# Patient Record
Sex: MALE | Race: WHITE | NOT HISPANIC OR LATINO
[De-identification: names, ages, dates, MRNs, and addresses within clinical notes are randomized per-mention and may not be internally consistent; named-entity substitution may affect disease eponyms.]

---

## 2017-01-12 ENCOUNTER — MEDICATION RENEWAL (OUTPATIENT)
Age: 27
End: 2017-01-12

## 2017-02-13 ENCOUNTER — MEDICATION RENEWAL (OUTPATIENT)
Age: 27
End: 2017-02-13

## 2017-02-24 ENCOUNTER — APPOINTMENT (OUTPATIENT)
Dept: INTERNAL MEDICINE | Facility: CLINIC | Age: 27
End: 2017-02-24

## 2017-03-10 ENCOUNTER — APPOINTMENT (OUTPATIENT)
Dept: INTERNAL MEDICINE | Facility: CLINIC | Age: 27
End: 2017-03-10

## 2017-03-13 ENCOUNTER — MEDICATION RENEWAL (OUTPATIENT)
Age: 27
End: 2017-03-13

## 2017-04-12 ENCOUNTER — MEDICATION RENEWAL (OUTPATIENT)
Age: 27
End: 2017-04-12

## 2017-05-03 ENCOUNTER — APPOINTMENT (OUTPATIENT)
Dept: INTERNAL MEDICINE | Facility: CLINIC | Age: 27
End: 2017-05-03

## 2017-05-04 VITALS — DIASTOLIC BLOOD PRESSURE: 70 MMHG | SYSTOLIC BLOOD PRESSURE: 128 MMHG | BODY MASS INDEX: 43.54 KG/M2 | WEIGHT: 315 LBS

## 2017-05-11 ENCOUNTER — MEDICATION RENEWAL (OUTPATIENT)
Age: 27
End: 2017-05-11

## 2017-06-12 ENCOUNTER — MEDICATION RENEWAL (OUTPATIENT)
Age: 27
End: 2017-06-12

## 2017-06-25 ENCOUNTER — APPOINTMENT (OUTPATIENT)
Dept: SLEEP CENTER | Facility: CLINIC | Age: 27
End: 2017-06-25

## 2017-07-11 ENCOUNTER — MEDICATION RENEWAL (OUTPATIENT)
Age: 27
End: 2017-07-11

## 2017-07-19 ENCOUNTER — APPOINTMENT (OUTPATIENT)
Dept: INTERNAL MEDICINE | Facility: CLINIC | Age: 27
End: 2017-07-19

## 2017-07-23 ENCOUNTER — APPOINTMENT (OUTPATIENT)
Dept: SLEEP CENTER | Facility: CLINIC | Age: 27
End: 2017-07-23

## 2017-08-08 ENCOUNTER — MEDICATION RENEWAL (OUTPATIENT)
Age: 27
End: 2017-08-08

## 2017-08-23 ENCOUNTER — APPOINTMENT (OUTPATIENT)
Dept: PAIN MANAGEMENT | Facility: CLINIC | Age: 27
End: 2017-08-23

## 2017-08-24 ENCOUNTER — APPOINTMENT (OUTPATIENT)
Dept: INTERNAL MEDICINE | Facility: CLINIC | Age: 27
End: 2017-08-24

## 2017-09-05 ENCOUNTER — MEDICATION RENEWAL (OUTPATIENT)
Age: 27
End: 2017-09-05

## 2017-09-25 ENCOUNTER — MEDICATION RENEWAL (OUTPATIENT)
Age: 27
End: 2017-09-25

## 2017-10-05 ENCOUNTER — APPOINTMENT (OUTPATIENT)
Dept: SLEEP CENTER | Facility: CLINIC | Age: 27
End: 2017-10-05

## 2017-10-30 ENCOUNTER — MEDICATION RENEWAL (OUTPATIENT)
Age: 27
End: 2017-10-30

## 2017-11-14 ENCOUNTER — MEDICATION RENEWAL (OUTPATIENT)
Age: 27
End: 2017-11-14

## 2017-11-24 ENCOUNTER — MEDICATION RENEWAL (OUTPATIENT)
Age: 27
End: 2017-11-24

## 2017-11-30 ENCOUNTER — OTHER (OUTPATIENT)
Age: 27
End: 2017-11-30

## 2017-12-01 ENCOUNTER — APPOINTMENT (OUTPATIENT)
Dept: INTERNAL MEDICINE | Facility: CLINIC | Age: 27
End: 2017-12-01

## 2017-12-12 ENCOUNTER — EMERGENCY (EMERGENCY)
Facility: HOSPITAL | Age: 27
LOS: 1 days | Discharge: HOME | End: 2017-12-12
Attending: EMERGENCY MEDICINE | Admitting: EMERGENCY MEDICINE
Payer: COMMERCIAL

## 2017-12-12 VITALS
SYSTOLIC BLOOD PRESSURE: 156 MMHG | RESPIRATION RATE: 18 BRPM | TEMPERATURE: 99 F | HEART RATE: 86 BPM | DIASTOLIC BLOOD PRESSURE: 89 MMHG | OXYGEN SATURATION: 96 %

## 2017-12-12 DIAGNOSIS — S73.005A UNSPECIFIED DISLOCATION OF LEFT HIP, INITIAL ENCOUNTER: Chronic | ICD-10-CM

## 2017-12-12 PROCEDURE — 99284 EMERGENCY DEPT VISIT MOD MDM: CPT | Mod: 25

## 2017-12-12 PROCEDURE — 93010 ELECTROCARDIOGRAM REPORT: CPT

## 2017-12-12 NOTE — ED ADULT NURSE NOTE - OBJECTIVE STATEMENT
Patient came to ED ambulatory c/o cough and right chest pain x 2/3 days. Pain mostly w/cough. Patient also c/o right mid back pain. Patient has history of PNa in the past. No further complaints. Denies fever or chills

## 2017-12-12 NOTE — ED ADULT TRIAGE NOTE - CHIEF COMPLAINT QUOTE
R pleuritic chest pain on inspiration x 2 days. Pt had cough/sore throat 1.5 weeks ago that has since resolved, was on Augmentin for 7 days, completed abx. Pt denies SOB. Pt denies fevers, chills.

## 2017-12-13 VITALS
DIASTOLIC BLOOD PRESSURE: 86 MMHG | OXYGEN SATURATION: 96 % | RESPIRATION RATE: 19 BRPM | HEART RATE: 68 BPM | SYSTOLIC BLOOD PRESSURE: 141 MMHG

## 2017-12-13 PROCEDURE — 93005 ELECTROCARDIOGRAM TRACING: CPT

## 2017-12-13 PROCEDURE — 99283 EMERGENCY DEPT VISIT LOW MDM: CPT | Mod: 25

## 2017-12-13 PROCEDURE — 71046 X-RAY EXAM CHEST 2 VIEWS: CPT

## 2017-12-13 PROCEDURE — 71020: CPT | Mod: 26

## 2017-12-13 RX ORDER — GUAIFENESIN/DEXTROMETHORPHAN 600MG-30MG
2 TABLET, EXTENDED RELEASE 12 HR ORAL
Qty: 120 | Refills: 0 | OUTPATIENT
Start: 2017-12-13 | End: 2017-12-17

## 2017-12-13 NOTE — ED PROVIDER NOTE - OBJECTIVE STATEMENT
27 male otherwise healthy here for right chest pain, worse with inspiration. Onset 2 days ago, s/p sore throat and course of augmentin. Pain only when inhales or when coughs. No F/c. Also radiates to back. No shortness of breath, no lightheadedness.   Primary: Dr. Jemal Chowdhury

## 2017-12-13 NOTE — ED PROVIDER NOTE - CARE PLAN
Principal Discharge DX:	Pneumonia of right lung due to infectious organism, unspecified part of lung  Instructions for follow-up, activity and diet:	Take antibiotics as instructed in prescription  Follow up with your primary doctor within the next 1-2 days  If you have any worsening or changing symptoms such as confusion, loss of consciousness, worsening pain, nausea/vomiting, or if you have any other concerns please return to the emergency department

## 2017-12-13 NOTE — ED PROVIDER NOTE - PHYSICAL EXAMINATION
Gen: AAOX3, in no acute distress, speaking in complete sentences, cooperative with examination  HEENT: PERRL, EOMI, moist oral mucosa, patent airway, normal pharynx, supple neck  Heart: RRR, no murmurs or gallops, no friction rub  Lungs: CTAX2, symmetric chest expansion  Abd: BS+, soft and depressible, nontender to palpation  Ext: no edema or cyanosis, no gross deformities  Skin: no rashes or lesions, capillary refill <2 secs  Neuro: no gross deficits Dr. Gil: Gen: AAOX3, in no acute distress, speaking in complete sentences, cooperative with examination  HEENT: PERRL, EOMI, moist oral mucosa, patent airway, normal pharynx, supple neck  Heart: RRR, no murmurs or gallops, no friction rub  Lungs: CTAX2, symmetric chest expansion  Abd: BS+, soft and depressible, nontender to palpation  Ext: no edema or cyanosis, no gross deformities  Skin: no rashes or lesions, capillary refill <2 secs  Neuro: no gross deficits

## 2017-12-13 NOTE — ED PROVIDER NOTE - PLAN OF CARE
Take antibiotics as instructed in prescription  Follow up with your primary doctor within the next 1-2 days  If you have any worsening or changing symptoms such as confusion, loss of consciousness, worsening pain, nausea/vomiting, or if you have any other concerns please return to the emergency department

## 2017-12-13 NOTE — ED PROVIDER NOTE - NS ED ROS FT
GENERAL: No fever or chills, //             EYES: no change in vision, //             HEENT: no trouble swallowing or speaking, //                   GI: no abdominal pain, no nausea or no vomiting, no diarrhea or constipation, //             : No changes in urination,  //            SKIN: no rashes,  //            NEURO: no headache,  //             MSK: No joint pain otherwise as HPI or negative. ~Mony Lockhart M.D., Ph.D. -Resident

## 2017-12-18 ENCOUNTER — MEDICATION RENEWAL (OUTPATIENT)
Age: 27
End: 2017-12-18

## 2017-12-22 ENCOUNTER — MEDICATION RENEWAL (OUTPATIENT)
Age: 27
End: 2017-12-22

## 2018-01-16 ENCOUNTER — MEDICATION RENEWAL (OUTPATIENT)
Age: 28
End: 2018-01-16

## 2018-01-19 ENCOUNTER — MEDICATION RENEWAL (OUTPATIENT)
Age: 28
End: 2018-01-19

## 2018-02-20 ENCOUNTER — MEDICATION RENEWAL (OUTPATIENT)
Age: 28
End: 2018-02-20

## 2018-03-12 ENCOUNTER — MEDICATION RENEWAL (OUTPATIENT)
Age: 28
End: 2018-03-12

## 2018-03-14 ENCOUNTER — APPOINTMENT (OUTPATIENT)
Dept: INTERNAL MEDICINE | Facility: CLINIC | Age: 28
End: 2018-03-14
Payer: COMMERCIAL

## 2018-03-14 VITALS
BODY MASS INDEX: 46.11 KG/M2 | HEART RATE: 70 BPM | WEIGHT: 315 LBS | RESPIRATION RATE: 14 BRPM | DIASTOLIC BLOOD PRESSURE: 84 MMHG | SYSTOLIC BLOOD PRESSURE: 132 MMHG

## 2018-03-14 DIAGNOSIS — N52.9 MALE ERECTILE DYSFUNCTION, UNSPECIFIED: ICD-10-CM

## 2018-03-14 PROCEDURE — 99213 OFFICE O/P EST LOW 20 MIN: CPT

## 2018-03-16 ENCOUNTER — MEDICATION RENEWAL (OUTPATIENT)
Age: 28
End: 2018-03-16

## 2018-03-16 ENCOUNTER — EMERGENCY (EMERGENCY)
Facility: HOSPITAL | Age: 28
LOS: 1 days | Discharge: HOME | End: 2018-03-16
Attending: EMERGENCY MEDICINE | Admitting: EMERGENCY MEDICINE
Payer: COMMERCIAL

## 2018-03-16 VITALS
RESPIRATION RATE: 18 BRPM | HEART RATE: 95 BPM | SYSTOLIC BLOOD PRESSURE: 140 MMHG | DIASTOLIC BLOOD PRESSURE: 81 MMHG | OXYGEN SATURATION: 97 %

## 2018-03-16 VITALS
RESPIRATION RATE: 20 BRPM | OXYGEN SATURATION: 97 % | DIASTOLIC BLOOD PRESSURE: 80 MMHG | SYSTOLIC BLOOD PRESSURE: 148 MMHG | HEART RATE: 81 BPM

## 2018-03-16 DIAGNOSIS — S73.005A UNSPECIFIED DISLOCATION OF LEFT HIP, INITIAL ENCOUNTER: Chronic | ICD-10-CM

## 2018-03-16 PROCEDURE — 71046 X-RAY EXAM CHEST 2 VIEWS: CPT | Mod: 26

## 2018-03-16 PROCEDURE — 71046 X-RAY EXAM CHEST 2 VIEWS: CPT

## 2018-03-16 PROCEDURE — 93005 ELECTROCARDIOGRAM TRACING: CPT

## 2018-03-16 PROCEDURE — 93010 ELECTROCARDIOGRAM REPORT: CPT

## 2018-03-16 PROCEDURE — 99283 EMERGENCY DEPT VISIT LOW MDM: CPT | Mod: 25

## 2018-03-16 PROCEDURE — 99284 EMERGENCY DEPT VISIT MOD MDM: CPT | Mod: 25

## 2018-03-16 RX ORDER — IBUPROFEN 200 MG
1 TABLET ORAL
Qty: 21 | Refills: 0 | OUTPATIENT
Start: 2018-03-16 | End: 2018-03-22

## 2018-03-16 RX ORDER — IBUPROFEN 200 MG
600 TABLET ORAL ONCE
Qty: 0 | Refills: 0 | Status: COMPLETED | OUTPATIENT
Start: 2018-03-16 | End: 2018-03-16

## 2018-03-16 RX ADMIN — Medication 600 MILLIGRAM(S): at 02:28

## 2018-03-16 NOTE — ED ADULT NURSE NOTE - OBJECTIVE STATEMENT
Left sided chest wall/flank pain since this morning. Patient denies cough. Pain is worse when he takes a deep breath. Patient denies recent travel.
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
## 2018-03-16 NOTE — ED ADULT NURSE NOTE - DISCHARGE TEACHING
Pt discharged as per MD order, d/c instructions provided; pt verbalized understanding; VSS at time of discharge.A&Ox4. Ambulating without difficulty.

## 2018-03-16 NOTE — ED PROVIDER NOTE - OBJECTIVE STATEMENT
27 y.o. male previously healthy pw left sided chest pain x 2 days, also with runny nose and nasal congestion. No cough. Worse when taking a deep breath. Sharp. No ab pain, nausea, vomiting. No trauma or rash. No recent travel, no immobilization or recent surgery. No leg swelling. 27 y.o. male previously healthy pw left sided chest pain x 2 days, also with runny nose and nasal congestion. No cough. Non-pleuritic, non-exertional. No ab pain, nausea, vomiting. No trauma or rash. No recent travel, no immobilization or recent surgery. No leg swelling.

## 2018-03-16 NOTE — ED PROVIDER NOTE - NS ED ROS FT
ROS: denies HA, weakness, dizziness, fevers/chills, nausea/vomiting, SOB, diaphoresis, abdominal pain, back/neck pain, dysuria/hematuria, or rash  +left sided chest pain

## 2018-03-16 NOTE — ED PROVIDER NOTE - ATTENDING CONTRIBUTION TO CARE
I was physically present for the E/M service provided. I agree with above history, physical, and plan which I have reviewed and edited where appropriate. I was physically present for the key portions of the service provided.    27M p/w intermittent non-exertional, non-pleuritic left side chest pain a/w URI Sx. No h/o HTN. HL. DM, tobacco use or early family h/o MI. No recent travel or recent immobilization. No LE pain or swelling. Heart score 0. Well's and PERC negative. Afebrile. Lungs CTA. Heart RRR. No LE TTP or edema. CXR clear. EKG NSR. Supportive care oupt w/ NSAID and f/u PCP.

## 2018-03-20 ENCOUNTER — MED ADMIN CHARGE (OUTPATIENT)
Age: 28
End: 2018-03-20

## 2018-04-12 ENCOUNTER — MEDICATION RENEWAL (OUTPATIENT)
Age: 28
End: 2018-04-12

## 2018-04-16 ENCOUNTER — MEDICATION RENEWAL (OUTPATIENT)
Age: 28
End: 2018-04-16

## 2018-05-07 ENCOUNTER — MEDICATION RENEWAL (OUTPATIENT)
Age: 28
End: 2018-05-07

## 2018-05-11 ENCOUNTER — MEDICATION RENEWAL (OUTPATIENT)
Age: 28
End: 2018-05-11

## 2018-05-25 ENCOUNTER — MEDICATION RENEWAL (OUTPATIENT)
Age: 28
End: 2018-05-25

## 2018-06-05 ENCOUNTER — MEDICATION RENEWAL (OUTPATIENT)
Age: 28
End: 2018-06-05

## 2018-06-07 ENCOUNTER — MEDICATION RENEWAL (OUTPATIENT)
Age: 28
End: 2018-06-07

## 2018-07-02 ENCOUNTER — MEDICATION RENEWAL (OUTPATIENT)
Age: 28
End: 2018-07-02

## 2018-07-11 ENCOUNTER — MEDICATION RENEWAL (OUTPATIENT)
Age: 28
End: 2018-07-11

## 2018-07-27 ENCOUNTER — MEDICATION RENEWAL (OUTPATIENT)
Age: 28
End: 2018-07-27

## 2018-08-27 ENCOUNTER — MEDICATION RENEWAL (OUTPATIENT)
Age: 28
End: 2018-08-27

## 2018-09-21 ENCOUNTER — MEDICATION RENEWAL (OUTPATIENT)
Age: 28
End: 2018-09-21

## 2018-09-21 ENCOUNTER — OTHER (OUTPATIENT)
Age: 28
End: 2018-09-21

## 2018-10-15 ENCOUNTER — MEDICATION RENEWAL (OUTPATIENT)
Age: 28
End: 2018-10-15

## 2018-10-16 ENCOUNTER — MEDICATION RENEWAL (OUTPATIENT)
Age: 28
End: 2018-10-16

## 2018-11-15 ENCOUNTER — MEDICATION RENEWAL (OUTPATIENT)
Age: 28
End: 2018-11-15

## 2018-12-14 ENCOUNTER — MEDICATION RENEWAL (OUTPATIENT)
Age: 28
End: 2018-12-14

## 2019-01-11 PROBLEM — M54.9 DORSALGIA, UNSPECIFIED: Chronic | Status: ACTIVE | Noted: 2017-12-12

## 2019-01-12 ENCOUNTER — MEDICATION RENEWAL (OUTPATIENT)
Age: 29
End: 2019-01-12

## 2019-01-24 ENCOUNTER — APPOINTMENT (OUTPATIENT)
Dept: INTERNAL MEDICINE | Facility: CLINIC | Age: 29
End: 2019-01-24
Payer: COMMERCIAL

## 2019-01-24 PROCEDURE — 99212 OFFICE O/P EST SF 10 MIN: CPT

## 2019-01-24 NOTE — PHYSICAL EXAM
[Normal] : Normal [L-Spine ___ (level)] : ~Ulevel [unfilled] lumbar spine [4] : L4 [5] : L5 [Restricted] : was restricted

## 2019-01-24 NOTE — HISTORY OF PRESENT ILLNESS
[Back Pain] : back pain [Doing Well] : doing well [Back Pain] : improved back pain [Constipation] : constipation [Pain Relief Sufficient to Make Difference] : pain relief is sufficient to make a difference [Better] : overall functioning better  [Mild] : mild constipation [None] : no mental cloudiness [Adherent] : the patient is adherent to ~his/her~ medication regimen [Goals--Doing Well] : the patient is doing well with ~his/her~ goals [Routine Drug Testing] : routine drug testing [Opioid Contract Renewal] : opioid contract renewal [Numbness] : no numbness [Weakness] : no weakness [Depression] : no depression [Sleep Disturbance] : no sleep disturbance [Side Effects] : ~he/she~ denies medication side effects

## 2019-01-24 NOTE — ASSESSMENT
[FreeTextEntry1] : Review of use of pain medications seems appropriate. Able to maintain social and occupational functionality. Denies such side effects as excessive somnolence, constipation and pruritus.  No evidence or concern about aberrant behavior, including diverting or use for non-medical reasons.\par \par Meds renewed.  Establishing with pain management

## 2019-01-29 LAB — COMPREHENSIVE SCREEN URINE: NORMAL

## 2019-02-11 ENCOUNTER — MEDICATION RENEWAL (OUTPATIENT)
Age: 29
End: 2019-02-11

## 2019-02-14 NOTE — ED PROVIDER NOTE - TOBACCO USE
Call back to patient and left detailed message advising [facility] out of the office and no results are back yet. Ok per Lily form. Advised to call back with any questions. Unknown if ever smoked

## 2019-02-26 ENCOUNTER — APPOINTMENT (OUTPATIENT)
Dept: INTERNAL MEDICINE | Facility: CLINIC | Age: 29
End: 2019-02-26
Payer: COMMERCIAL

## 2019-02-26 PROCEDURE — 99213 OFFICE O/P EST LOW 20 MIN: CPT

## 2019-02-26 NOTE — ASSESSMENT
[FreeTextEntry1] : Review of use of pain medications seems appropriate. Able to maintain social and occupational functionality. Denies such side effects as excessive somnolence, constipation and pruritus.  No evidence or concern about aberrant behavior, including diverting or use for non-medical reasons.  Given ongoing pain issues, seems appropriate to continue regimen as is; tapering not feasible at this time, but will revisit in future as situation changes.   Will be transitioning to pain management specialist care.\par

## 2019-02-26 NOTE — HISTORY OF PRESENT ILLNESS
[de-identified] : . [Back Pain] : back pain [Doing Well] : doing well [None] : There are no comorbid illnesses [Back Pain] : stable back pain [Numbness] : no numbness [Weakness] : no weakness [Depression] : no depression [Sleep Disturbance] : no sleep disturbance [Constipation] : no constipation [5] : Average:5 [Better] : overall functioning better  [Mild] : mild constipation [Adherent] : the patient is adherent to ~his/her~ medication regimen [Routine Drug Testing] : routine drug testing

## 2019-03-03 LAB — COMPREHENSIVE SCREEN URINE: NORMAL

## 2019-03-13 ENCOUNTER — MEDICATION RENEWAL (OUTPATIENT)
Age: 29
End: 2019-03-13

## 2019-03-26 ENCOUNTER — APPOINTMENT (OUTPATIENT)
Dept: INTERNAL MEDICINE | Facility: CLINIC | Age: 29
End: 2019-03-26
Payer: COMMERCIAL

## 2019-03-26 PROCEDURE — 99213 OFFICE O/P EST LOW 20 MIN: CPT

## 2019-03-27 NOTE — HISTORY OF PRESENT ILLNESS
[Back Pain] : back pain [Doing Well] : doing well [None] : There are no comorbid illnesses [Back Pain] : stable back pain [Numbness] : no numbness [Weakness] : no weakness [Depression] : no depression [Sleep Disturbance] : no sleep disturbance [Constipation] : no constipation [5] : Average:5 [Better] : overall functioning better  [Mild] : mild constipation [Adherent] : the patient is adherent to ~his/her~ medication regimen [Routine Drug Testing] : routine drug testing

## 2019-04-01 LAB — COMPREHENSIVE SCREEN URINE: NORMAL

## 2019-04-09 ENCOUNTER — MEDICATION RENEWAL (OUTPATIENT)
Age: 29
End: 2019-04-09

## 2019-04-16 ENCOUNTER — INPATIENT (INPATIENT)
Facility: HOSPITAL | Age: 29
LOS: 0 days | Discharge: HOME | DRG: 125 | End: 2019-04-17
Attending: SPECIALIST | Admitting: SPECIALIST
Payer: MEDICAID

## 2019-04-16 VITALS
OXYGEN SATURATION: 100 % | RESPIRATION RATE: 18 BRPM | HEIGHT: 73 IN | WEIGHT: 300.05 LBS | DIASTOLIC BLOOD PRESSURE: 100 MMHG | TEMPERATURE: 98 F | SYSTOLIC BLOOD PRESSURE: 148 MMHG | HEART RATE: 59 BPM

## 2019-04-16 DIAGNOSIS — S73.005A UNSPECIFIED DISLOCATION OF LEFT HIP, INITIAL ENCOUNTER: Chronic | ICD-10-CM

## 2019-04-16 DIAGNOSIS — S02.80XA FRACTURE OF OTHER SPECIFIED SKULL AND FACIAL BONES, UNSPECIFIED SIDE, INITIAL ENCOUNTER FOR CLOSED FRACTURE: ICD-10-CM

## 2019-04-16 LAB
ALBUMIN SERPL ELPH-MCNC: 4.4 G/DL — SIGNIFICANT CHANGE UP (ref 3.3–5)
ALP SERPL-CCNC: 71 U/L — SIGNIFICANT CHANGE UP (ref 40–120)
ALT FLD-CCNC: 19 U/L — SIGNIFICANT CHANGE UP (ref 10–45)
ANION GAP SERPL CALC-SCNC: 14 MMOL/L — SIGNIFICANT CHANGE UP (ref 5–17)
APTT BLD: 27.5 SEC — SIGNIFICANT CHANGE UP (ref 27.5–36.3)
AST SERPL-CCNC: 20 U/L — SIGNIFICANT CHANGE UP (ref 10–40)
BASOPHILS # BLD AUTO: 0 K/UL — SIGNIFICANT CHANGE UP (ref 0–0.2)
BASOPHILS NFR BLD AUTO: 0.1 % — SIGNIFICANT CHANGE UP (ref 0–2)
BILIRUB SERPL-MCNC: 0.5 MG/DL — SIGNIFICANT CHANGE UP (ref 0.2–1.2)
BLD GP AB SCN SERPL QL: NEGATIVE — SIGNIFICANT CHANGE UP
BUN SERPL-MCNC: 16 MG/DL — SIGNIFICANT CHANGE UP (ref 7–23)
CALCIUM SERPL-MCNC: 9.7 MG/DL — SIGNIFICANT CHANGE UP (ref 8.4–10.5)
CHLORIDE SERPL-SCNC: 103 MMOL/L — SIGNIFICANT CHANGE UP (ref 96–108)
CO2 SERPL-SCNC: 21 MMOL/L — LOW (ref 22–31)
CREAT SERPL-MCNC: 1.22 MG/DL — SIGNIFICANT CHANGE UP (ref 0.5–1.3)
EOSINOPHIL # BLD AUTO: 0.1 K/UL — SIGNIFICANT CHANGE UP (ref 0–0.5)
EOSINOPHIL NFR BLD AUTO: 0.8 % — SIGNIFICANT CHANGE UP (ref 0–6)
GLUCOSE SERPL-MCNC: 101 MG/DL — HIGH (ref 70–99)
HCT VFR BLD CALC: 51.5 % — HIGH (ref 39–50)
HGB BLD-MCNC: 17.3 G/DL — HIGH (ref 13–17)
INR BLD: 1.06 RATIO — SIGNIFICANT CHANGE UP (ref 0.88–1.16)
LYMPHOCYTES # BLD AUTO: 1.3 K/UL — SIGNIFICANT CHANGE UP (ref 1–3.3)
LYMPHOCYTES # BLD AUTO: 11.5 % — LOW (ref 13–44)
MCHC RBC-ENTMCNC: 30 PG — SIGNIFICANT CHANGE UP (ref 27–34)
MCHC RBC-ENTMCNC: 33.7 GM/DL — SIGNIFICANT CHANGE UP (ref 32–36)
MCV RBC AUTO: 89 FL — SIGNIFICANT CHANGE UP (ref 80–100)
MONOCYTES # BLD AUTO: 0.6 K/UL — SIGNIFICANT CHANGE UP (ref 0–0.9)
MONOCYTES NFR BLD AUTO: 5.4 % — SIGNIFICANT CHANGE UP (ref 2–14)
NEUTROPHILS # BLD AUTO: 9.4 K/UL — HIGH (ref 1.8–7.4)
NEUTROPHILS NFR BLD AUTO: 82.2 % — HIGH (ref 43–77)
PLATELET # BLD AUTO: 210 K/UL — SIGNIFICANT CHANGE UP (ref 150–400)
POTASSIUM SERPL-MCNC: 4.1 MMOL/L — SIGNIFICANT CHANGE UP (ref 3.5–5.3)
POTASSIUM SERPL-SCNC: 4.1 MMOL/L — SIGNIFICANT CHANGE UP (ref 3.5–5.3)
PROT SERPL-MCNC: 7.1 G/DL — SIGNIFICANT CHANGE UP (ref 6–8.3)
PROTHROM AB SERPL-ACNC: 12.1 SEC — SIGNIFICANT CHANGE UP (ref 10–12.9)
RBC # BLD: 5.78 M/UL — SIGNIFICANT CHANGE UP (ref 4.2–5.8)
RBC # FLD: 13.1 % — SIGNIFICANT CHANGE UP (ref 10.3–14.5)
RH IG SCN BLD-IMP: POSITIVE — SIGNIFICANT CHANGE UP
SODIUM SERPL-SCNC: 138 MMOL/L — SIGNIFICANT CHANGE UP (ref 135–145)
WBC # BLD: 11.4 K/UL — HIGH (ref 3.8–10.5)
WBC # FLD AUTO: 11.4 K/UL — HIGH (ref 3.8–10.5)

## 2019-04-16 PROCEDURE — 71045 X-RAY EXAM CHEST 1 VIEW: CPT | Mod: 26

## 2019-04-16 PROCEDURE — 12011 RPR F/E/E/N/L/M 2.5 CM/<: CPT

## 2019-04-16 PROCEDURE — 70486 CT MAXILLOFACIAL W/O DYE: CPT | Mod: 26

## 2019-04-16 PROCEDURE — 99285 EMERGENCY DEPT VISIT HI MDM: CPT | Mod: 25

## 2019-04-16 RX ORDER — DEXAMETHASONE 0.5 MG/5ML
10 ELIXIR ORAL
Qty: 0 | Refills: 0 | Status: DISCONTINUED | OUTPATIENT
Start: 2019-04-16 | End: 2019-04-17

## 2019-04-16 RX ORDER — ACETAMINOPHEN 500 MG
650 TABLET ORAL EVERY 6 HOURS
Qty: 0 | Refills: 0 | Status: DISCONTINUED | OUTPATIENT
Start: 2019-04-16 | End: 2019-04-17

## 2019-04-16 RX ORDER — TETANUS TOXOID, REDUCED DIPHTHERIA TOXOID AND ACELLULAR PERTUSSIS VACCINE, ADSORBED 5; 2.5; 8; 8; 2.5 [IU]/.5ML; [IU]/.5ML; UG/.5ML; UG/.5ML; UG/.5ML
0.5 SUSPENSION INTRAMUSCULAR ONCE
Qty: 0 | Refills: 0 | Status: COMPLETED | OUTPATIENT
Start: 2019-04-16 | End: 2019-04-16

## 2019-04-16 RX ORDER — SODIUM CHLORIDE 9 MG/ML
1000 INJECTION, SOLUTION INTRAVENOUS
Qty: 0 | Refills: 0 | Status: COMPLETED | OUTPATIENT
Start: 2019-04-16 | End: 2020-03-14

## 2019-04-16 RX ORDER — ACETAMINOPHEN 500 MG
975 TABLET ORAL ONCE
Qty: 0 | Refills: 0 | Status: COMPLETED | OUTPATIENT
Start: 2019-04-16 | End: 2019-04-16

## 2019-04-16 RX ORDER — OXYCODONE AND ACETAMINOPHEN 5; 325 MG/1; MG/1
2 TABLET ORAL EVERY 6 HOURS
Qty: 0 | Refills: 0 | Status: DISCONTINUED | OUTPATIENT
Start: 2019-04-16 | End: 2019-04-17

## 2019-04-16 RX ORDER — OXYCODONE AND ACETAMINOPHEN 5; 325 MG/1; MG/1
1 TABLET ORAL EVERY 4 HOURS
Qty: 0 | Refills: 0 | Status: DISCONTINUED | OUTPATIENT
Start: 2019-04-16 | End: 2019-04-17

## 2019-04-16 RX ADMIN — Medication 10 MILLIGRAM(S): at 17:08

## 2019-04-16 RX ADMIN — Medication 975 MILLIGRAM(S): at 07:22

## 2019-04-16 RX ADMIN — Medication 650 MILLIGRAM(S): at 17:11

## 2019-04-16 RX ADMIN — TETANUS TOXOID, REDUCED DIPHTHERIA TOXOID AND ACELLULAR PERTUSSIS VACCINE, ADSORBED 0.5 MILLILITER(S): 5; 2.5; 8; 8; 2.5 SUSPENSION INTRAMUSCULAR at 03:28

## 2019-04-16 RX ADMIN — Medication 975 MILLIGRAM(S): at 04:04
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
## 2019-04-16 NOTE — ED PROVIDER NOTE - ATTENDING CONTRIBUTION TO CARE
Attending MD Fanny Vega:  I personally have seen and examined this patient.  Resident note reviewed and agree on plan of care and except where noted.  See HPI, PE, and MDM for details.
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
## 2019-04-16 NOTE — ED PROVIDER NOTE - SKIN COLOR
1cm laceration mucosal aspect of left side of upper lip - does not cross vermillion border. No foreign body present.
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
## 2019-04-16 NOTE — H&P ADULT - NSHPPHYSICALEXAM_GEN_ALL_CORE
Gen: NAD, well appearing male  HEENT: EOMI, no conjunctival hemorrhage. Sensation grossly intact in V1-3. Occlusion stable and reproducible. Condylar head palpated. No bony stepoffs palpated. No intranasal hemorrhage or septal hematoma

## 2019-04-16 NOTE — ED PROVIDER NOTE - PHYSICAL EXAMINATION
Attending Fanny Vega: Gen: NAD, heent: atrauamtic, eomi, perrla, right maxilla ttp,swelling to nare upper lip with laceration apprximatly 2cm lip flap, lower lip laceration mmm, op pink, uvula midline, neck; nttp, no nuchal rigidity, chest: nttp, no crepitus, cv: rrr, no murmurs, lungs: ctab, abd: soft, nontender, nondistended, no peritoneal signs, +BS, no guarding, ext: wwp, neg homans, skin: no rash, neuro: awake and alert, following commands, speech clear, sensation and strength intact, no focal deficits

## 2019-04-16 NOTE — H&P ADULT - NSHPLABSRESULTS_GEN_ALL_CORE
CBC (04-16 @ 07:24)                              17.3<H>                         11.4<H>  )----------------(  210        82.2<H>% Neutrophils, 11.5<L>% Lymphocytes, ANC: 9.4<H>                              51.5<H>                BMP (04-16 @ 07:24)             138     |  103     |  16    		Ca++ --      Ca 9.7                ---------------------------------( 101<H>		Mg --                 4.1     |  21<L>   |  1.22  			Ph --        LFTs (04-16 @ 07:24)      TPro 7.1 / Alb 4.4 / TBili 0.5 / DBili -- / AST 20 / ALT 19 / AlkPhos 71    Coags (04-16 @ 07:24)  aPTT 27.5 / INR 1.06 / PT 12.1
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
## 2019-04-17 ENCOUNTER — TRANSCRIPTION ENCOUNTER (OUTPATIENT)
Age: 29
End: 2019-04-17

## 2019-04-17 VITALS
HEART RATE: 110 BPM | TEMPERATURE: 97 F | OXYGEN SATURATION: 98 % | RESPIRATION RATE: 18 BRPM | SYSTOLIC BLOOD PRESSURE: 147 MMHG | DIASTOLIC BLOOD PRESSURE: 73 MMHG

## 2019-04-17 LAB
BLD GP AB SCN SERPL QL: NEGATIVE — SIGNIFICANT CHANGE UP
RH IG SCN BLD-IMP: POSITIVE — SIGNIFICANT CHANGE UP

## 2019-04-17 RX ORDER — ACETAMINOPHEN 500 MG
2 TABLET ORAL
Qty: 0 | Refills: 0 | COMMUNITY
Start: 2019-04-17

## 2019-04-17 RX ORDER — CHLORHEXIDINE GLUCONATE 213 G/1000ML
15 SOLUTION TOPICAL
Qty: 100 | Refills: 0 | OUTPATIENT
Start: 2019-04-17

## 2019-04-17 RX ORDER — OXYCODONE HYDROCHLORIDE 5 MG/1
0 TABLET ORAL
Qty: 0 | Refills: 0 | COMMUNITY

## 2019-04-17 RX ORDER — SODIUM CHLORIDE 9 MG/ML
1000 INJECTION, SOLUTION INTRAVENOUS
Qty: 0 | Refills: 0 | Status: DISCONTINUED | OUTPATIENT
Start: 2019-04-17 | End: 2019-04-17

## 2019-04-17 RX ADMIN — Medication 650 MILLIGRAM(S): at 06:10

## 2019-04-17 RX ADMIN — Medication 10 MILLIGRAM(S): at 05:32

## 2019-04-17 RX ADMIN — Medication 650 MILLIGRAM(S): at 05:34

## 2019-04-17 NOTE — DISCHARGE NOTE NURSING/CASE MANAGEMENT/SOCIAL WORK - NSDCDPATPORTLINK_GEN_ALL_CORE
You can access the [facility] Patient Portal, offered by [facility], by registering with the following website: [email or web address] Health [email or web address]

## 2019-04-17 NOTE — DISCHARGE NOTE PROVIDER - NSDCFUADDINST_GEN_ALL_CORE_FT
Keep head elevated.    Soft diet.    Wash mouth with mouthwash twice daily and perform regular dental hygiene.    Take medications as prescribed.    No nose blowing. Sneeze with mouth open.    Ice packs as needed to face.    Call Dr. Yin's office regarding your upcoming surgery. [phone].    Return to [place] at 7 am for your surgery on 4/19/2019.
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
## 2019-04-17 NOTE — DISCHARGE NOTE PROVIDER - CARE PROVIDER_API CALL
Nely Yin)  Plastic Surgery  [address] [place] [address]  Phone: [phone]  Fax: [phone]  Follow Up Time:
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
## 2019-04-18 RX ORDER — SODIUM CHLORIDE 9 MG/ML
3 INJECTION INTRAMUSCULAR; INTRAVENOUS; SUBCUTANEOUS EVERY 8 HOURS
Qty: 0 | Refills: 0 | Status: DISCONTINUED | OUTPATIENT
Start: 2019-04-25 | End: 2019-04-25

## 2019-04-23 ENCOUNTER — APPOINTMENT (OUTPATIENT)
Dept: INTERNAL MEDICINE | Facility: CLINIC | Age: 29
End: 2019-04-23
Payer: COMMERCIAL

## 2019-04-23 PROCEDURE — 99213 OFFICE O/P EST LOW 20 MIN: CPT
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
## 2019-04-24 ENCOUNTER — TRANSCRIPTION ENCOUNTER (OUTPATIENT)
Age: 29
End: 2019-04-24

## 2019-04-25 ENCOUNTER — OUTPATIENT (OUTPATIENT)
Dept: OUTPATIENT SERVICES | Facility: HOSPITAL | Age: 29
LOS: 1 days | End: 2019-04-25
Payer: MEDICAID

## 2019-04-25 VITALS
DIASTOLIC BLOOD PRESSURE: 76 MMHG | SYSTOLIC BLOOD PRESSURE: 136 MMHG | RESPIRATION RATE: 18 BRPM | OXYGEN SATURATION: 98 % | HEIGHT: 73 IN | TEMPERATURE: 97 F | WEIGHT: 315 LBS | HEART RATE: 92 BPM

## 2019-04-25 VITALS
OXYGEN SATURATION: 95 % | RESPIRATION RATE: 16 BRPM | SYSTOLIC BLOOD PRESSURE: 132 MMHG | HEART RATE: 88 BPM | TEMPERATURE: 99 F | DIASTOLIC BLOOD PRESSURE: 73 MMHG

## 2019-04-25 DIAGNOSIS — S73.005A UNSPECIFIED DISLOCATION OF LEFT HIP, INITIAL ENCOUNTER: Chronic | ICD-10-CM

## 2019-04-25 DIAGNOSIS — S02.80XA FRACTURE OF OTHER SPECIFIED SKULL AND FACIAL BONES, UNSPECIFIED SIDE, INITIAL ENCOUNTER FOR CLOSED FRACTURE: ICD-10-CM

## 2019-04-25 PROCEDURE — 76377 3D RENDER W/INTRP POSTPROCES: CPT

## 2019-04-25 PROCEDURE — 70486 CT MAXILLOFACIAL W/O DYE: CPT

## 2019-04-25 PROCEDURE — 76377 3D RENDER W/INTRP POSTPROCES: CPT | Mod: 26

## 2019-04-25 PROCEDURE — 21320 CLSD TX NSL FX W/MNPJ&STABLJ: CPT

## 2019-04-25 PROCEDURE — C1713: CPT

## 2019-04-25 PROCEDURE — 21390 OPN TX ORBIT PERIORBTL IMPLT: CPT

## 2019-04-25 PROCEDURE — 70486 CT MAXILLOFACIAL W/O DYE: CPT | Mod: 26

## 2019-04-25 RX ORDER — HYDROMORPHONE HYDROCHLORIDE 2 MG/ML
0.5 INJECTION INTRAMUSCULAR; INTRAVENOUS; SUBCUTANEOUS
Qty: 0 | Refills: 0 | Status: DISCONTINUED | OUTPATIENT
Start: 2019-04-25 | End: 2019-04-25

## 2019-04-25 RX ORDER — OXYCODONE HYDROCHLORIDE 5 MG/1
1 TABLET ORAL
Qty: 10 | Refills: 0 | OUTPATIENT
Start: 2019-04-25

## 2019-04-25 RX ORDER — HALOPERIDOL DECANOATE 100 MG/ML
1 INJECTION INTRAMUSCULAR ONCE
Qty: 0 | Refills: 0 | Status: DISCONTINUED | OUTPATIENT
Start: 2019-04-25 | End: 2019-05-10

## 2019-04-25 RX ORDER — SODIUM CHLORIDE 9 MG/ML
1000 INJECTION, SOLUTION INTRAVENOUS
Qty: 0 | Refills: 0 | Status: DISCONTINUED | OUTPATIENT
Start: 2019-04-25 | End: 2019-05-10

## 2019-04-25 RX ORDER — BACITRACIN 500 [USP'U]/G
0 OINTMENT OPHTHALMIC
Qty: 0 | Refills: 0 | COMMUNITY

## 2019-04-25 RX ADMIN — SODIUM CHLORIDE 100 MILLILITER(S): 9 INJECTION, SOLUTION INTRAVENOUS at 10:03
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
## 2019-04-25 NOTE — ASU DISCHARGE PLAN (ADULT/PEDIATRIC) - ASU DC SPECIAL INSTRUCTIONSFT
Keep the sutures in your eyelid in place and do not get wet. Apply the ophthalmic bacitracin to your R lower eyelid incision 2 times per day.  Sleep your the head of your bed elevated.    Keep the nasal splint in place and keep it dry.    Dr. Yin's office will call you to set up your follow-up appointment to have the sutures removed.

## 2019-04-25 NOTE — ASU DISCHARGE PLAN (ADULT/PEDIATRIC) - CALL YOUR DOCTOR IF YOU HAVE ANY OF THE FOLLOWING:
Fever greater than (need to indicate Fahrenheit or Celsius)/Bleeding that does not stop/Pain not relieved by Medications

## 2019-05-07 ENCOUNTER — MEDICATION RENEWAL (OUTPATIENT)
Age: 29
End: 2019-05-07

## 2019-05-23 ENCOUNTER — APPOINTMENT (OUTPATIENT)
Dept: INTERNAL MEDICINE | Facility: CLINIC | Age: 29
End: 2019-05-23
Payer: COMMERCIAL

## 2019-05-23 ENCOUNTER — MEDICATION RENEWAL (OUTPATIENT)
Age: 29
End: 2019-05-23

## 2019-05-23 PROCEDURE — 99213 OFFICE O/P EST LOW 20 MIN: CPT

## 2019-05-27 NOTE — HISTORY OF PRESENT ILLNESS
[Back Pain] : back pain [Doing Well] : doing well [None] : There are no comorbid illnesses [Numbness] : no numbness [Back Pain] : stable back pain [Depression] : no depression [Weakness] : no weakness [Sleep Disturbance] : no sleep disturbance [Constipation] : no constipation [5] : Average:5 [Adherent] : the patient is adherent to ~his/her~ medication regimen [Mild] : mild constipation [Better] : overall functioning better  [Routine Drug Testing] : routine drug testing

## 2019-05-29 LAB — COMPREHENSIVE SCREEN URINE: NORMAL

## 2019-06-03 PROBLEM — N52.9 MALE ERECTILE DISORDER: Status: ACTIVE | Noted: 2018-03-14

## 2019-06-04 ENCOUNTER — MEDICATION RENEWAL (OUTPATIENT)
Age: 29
End: 2019-06-04

## 2019-06-20 ENCOUNTER — APPOINTMENT (OUTPATIENT)
Dept: INTERNAL MEDICINE | Facility: CLINIC | Age: 29
End: 2019-06-20
Payer: COMMERCIAL

## 2019-06-20 PROCEDURE — 99212 OFFICE O/P EST SF 10 MIN: CPT

## 2019-06-21 NOTE — HISTORY OF PRESENT ILLNESS
[Back Pain] : back pain [Doing Well] : doing well [Back Pain] : stable back pain [Pain Relief Sufficient to Make Difference] : pain relief is sufficient to make a difference [Same] : overall functioning same [None] : no mental cloudiness [Adherent] : the patient is adherent to ~his/her~ medication regimen [Side Effects] : ~he/she~ denies medication side effects [Goals--Doing Well] : the patient is doing well with ~his/her~ goals

## 2019-06-26 LAB — COMPREHENSIVE SCREEN URINE: NORMAL

## 2019-07-18 ENCOUNTER — APPOINTMENT (OUTPATIENT)
Dept: INTERNAL MEDICINE | Facility: CLINIC | Age: 29
End: 2019-07-18
Payer: COMMERCIAL

## 2019-07-18 PROCEDURE — 99213 OFFICE O/P EST LOW 20 MIN: CPT
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
## 2019-07-21 ENCOUNTER — EMERGENCY (EMERGENCY)
Facility: HOSPITAL | Age: 29
LOS: 1 days | Discharge: HOME | End: 2019-07-21
Attending: STUDENT IN AN ORGANIZED HEALTH CARE EDUCATION/TRAINING PROGRAM
Payer: MEDICAID

## 2019-07-21 VITALS
HEIGHT: 73 IN | DIASTOLIC BLOOD PRESSURE: 80 MMHG | SYSTOLIC BLOOD PRESSURE: 118 MMHG | HEART RATE: 77 BPM | TEMPERATURE: 99 F | RESPIRATION RATE: 18 BRPM | WEIGHT: 315 LBS | OXYGEN SATURATION: 95 %

## 2019-07-21 DIAGNOSIS — S73.005A UNSPECIFIED DISLOCATION OF LEFT HIP, INITIAL ENCOUNTER: Chronic | ICD-10-CM

## 2019-07-21 PROCEDURE — 99284 EMERGENCY DEPT VISIT MOD MDM: CPT | Mod: 25

## 2019-07-21 PROCEDURE — 93010 ELECTROCARDIOGRAM REPORT: CPT

## 2019-07-22 PROCEDURE — 71046 X-RAY EXAM CHEST 2 VIEWS: CPT

## 2019-07-22 PROCEDURE — 99283 EMERGENCY DEPT VISIT LOW MDM: CPT

## 2019-07-22 PROCEDURE — 93005 ELECTROCARDIOGRAM TRACING: CPT

## 2019-07-22 PROCEDURE — 71046 X-RAY EXAM CHEST 2 VIEWS: CPT | Mod: 26

## 2019-07-22 NOTE — ED PROVIDER NOTE - PHYSICAL EXAMINATION
General: Well appearing, alert, oriented, no acute distress. Resting in bed.  HEENT: PERRLA EOMI. No trauma/bruising noted to head or face.   CV: Regular rate and rhythm, S1/S2, no murmurs/rubs/gallops noted on exam. No tenderness to palpation to chest wall.  Lungs: Clear to ascultation bilaterally, no wheezes/crackles/rales noted on exam.   Abdomen: Soft, non tender, non distended, no guarding or rebound. No CVA tenderness to palpation.   MSK: Full ROM of upper and lower extremities bilaterally. Full ROM of neck.   Neuro: Awake, A+O x4, moving all extremities spontaneously. CN 2-12 grossly intact. Strength and sensation grossly intact to all extremities. Ambulatory w/o assist, normal gait.   Extremities: No swelling or edema noted to extremities. No calf tenderness to palpation.   Skin: No rash noted on exam.
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
## 2019-07-22 NOTE — ED PROVIDER NOTE - NSFOLLOWUPINSTRUCTIONS_ED_ALL_ED_FT
Please follow up with your primary medical doctor this week for further care    Return to hospital for any new or concerning symptoms, including but not limited to: fevers, chills, nausea, vomiting, headache, dizziness, lightheadedness, chest pain, shortness of breath, difficulty breathing, abdominal pain, weakness, or any other new or concerning symptoms.    Take motrin 600mg every 6 hours as needed for pain  Take Tylenol up to 650 mg every 6 hours as needed for pain.

## 2019-07-22 NOTE — ED PROVIDER NOTE - PROGRESS NOTE DETAILS
CXR and EKG wnl. Patient resting in bed, no acute distress. Vitals wnl. Exam wnl. Low clinical suspicion for PNA, ACS, PE, or intra-abd pathology. Will d/c home with instructions to f/u with primary medical doctor this week as well as strict return precautions.  Spoke with patient extensively regarding current differential diagnosis for ongoing symptoms, and patient acknowledged understanding. All questions and concerns have been addressed with the patient. I have discussed the plan for care and patient is in agreement.   Marcus Fragoso MD, PGY3 Emergency Medicine

## 2019-07-22 NOTE — ED PROVIDER NOTE - OBJECTIVE STATEMENT
29M, no sig med hx, presenting with chief complaint of right lower chest pain. patient reports 1 day of right lower chest pain along inferior border of ribs, which radiates around to side of chest and back. Pain is primarily with inspiration and movement, not with exertion. NO fevers or chills, nausea or vomiting, headache, dizziness, lightheadedness, blurry vision, shortness of breath, BOOTH, abdominal pain, diarrhea or constipation, urinary sx, weakness, leg swelling or calf pain. Patient denies hx of DVT or PE. Denies recent illness. Denies long flights, long travel. No recent trauma, falls, strenuous exercise. No meds, no allergies, no tobacco, ethanol, or drug use as per patient. Of note, patient states he has had similar pain in past and was diagnosed with pneumonia on CXR.

## 2019-07-22 NOTE — ED ADULT NURSE NOTE - NSIMPLEMENTINTERV_GEN_ALL_ED
Implemented All Universal Safety Interventions:  [place] to call system. Call bell, personal items and telephone within reach. Instruct patient to call for assistance. Room bathroom lighting operational. Non-slip footwear when patient is off stretcher. Physically safe environment: no spills, clutter or unnecessary equipment. Stretcher in lowest position, wheels locked, appropriate side rails in place.

## 2019-07-22 NOTE — ED PROVIDER NOTE - CLINICAL SUMMARY MEDICAL DECISION MAKING FREE TEXT BOX
29M presents w 1 day of right chest wall pain with inspiration. No other associated sx. Exam wnl. Patient reports hx of similar in past and being dx with PNA, despite any other sx associated. Low clinical suspicion for PE. PERC negative. Low clinical suspicion for ACS. Will obtain EKG and CXR. Currently stable, no acute distress. Will continue to follow up and re-assess. Case discussed with Attending.  Marcus Fragoso MD, PGY3 Emergency Medicine

## 2019-07-23 LAB — COMPREHENSIVE SCREEN URINE: NORMAL

## 2019-08-11 ENCOUNTER — OTHER (OUTPATIENT)
Age: 29
End: 2019-08-11

## 2019-08-16 ENCOUNTER — APPOINTMENT (OUTPATIENT)
Dept: INTERNAL MEDICINE | Facility: CLINIC | Age: 29
End: 2019-08-16

## 2019-08-19 ENCOUNTER — MEDICATION RENEWAL (OUTPATIENT)
Age: 29
End: 2019-08-19

## 2019-09-10 ENCOUNTER — APPOINTMENT (OUTPATIENT)
Dept: INTERNAL MEDICINE | Facility: CLINIC | Age: 29
End: 2019-09-10
Payer: MEDICAID

## 2019-09-10 PROCEDURE — 99212 OFFICE O/P EST SF 10 MIN: CPT

## 2019-09-10 NOTE — HISTORY OF PRESENT ILLNESS
[Back Pain] : back pain [Doing Well] : doing well [Back Pain] : stable back pain [Pain Relief Sufficient to Make Difference] : pain relief is sufficient to make a difference [Same] : overall functioning same [None] : no mental cloudiness [Adherent] : the patient is adherent to ~his/her~ medication regimen [Goals--Doing Well] : the patient is doing well with ~his/her~ goals [Side Effects] : ~he/she~ denies medication side effects

## 2019-09-17 LAB — COMPREHENSIVE SCREEN URINE: NORMAL

## 2019-09-28 NOTE — ED ADULT TRIAGE NOTE - NS ED NOTE AC HIGH RISK COUNTRIES
CC: Left Ear Pain.     HPI: 41 YO F with no significant PMH presents to ED with Left Ear Pain. As per pt, pain started in 9/6, went to Urgent Care at that time, was prescribed Ocuflox 0.3% gtt. Pt finished course, and felt better. Left ear pain again started this Thursday 9/26, ocuflox was prescribed by , was using 5gtt bid per day. But the pain got worse, wasn't controlled by Tylenol/Motrin. Pt took a dose of oxycodone for pain at home. Also feeling " wet sensation" inside the ear. Denies any purulent discharge. Pt had a 100.2F fever at home. As per pt, pain constant, with intermittent bursts. Denies Q Tip use, any recent swimming, URI, sinus surgeries, Air plane travel. Admits to occasional seasonal allergies, takes OTC meds. Denies Tinnitus, vertigo, Dizziness, Nystagmus, Hearing loss, chills,  CP/SOB/Palpitations/Diaphoresis, HA/Blurry vision/ syncope, Abdominal Pain/N/V/D, recent travel/sick contacts.     In ED, pt WBC: 12.8. Temp of 99.5F at 0317am. Received Cipro 400mg IV X1, Morphine 4mg IV X1, Tylenol 975mg PO X1. CT IAC ordered.      PAST MEDICAL & SURGICAL HISTORY:  Migraines  No significant past surgical history    Allergies.   No Known Allergies    Intolerances.  MEDICATIONS  (STANDING):  MEDICATIONS  (PRN):    Social History: Denies smoking/alcohol/ drug use.   Family history: None.     ROS:   ENT: all negative except as noted in HPI   CV: denies palpitations  Pulm: denies SOB, cough, hemoptysis  GI: denies change in apetite, indigestion, n/v  : denies pertinent urinary symptoms, urgency  Neuro: denies numbness/tingling, loss of sensation  Psych: denies anxiety  MS: denies muscle weakness, instability  Heme: denies easy bruising or bleeding  Endo: denies heat/cold intolerance, excessive sweating  Vascular: denies LE edema    Vital Signs Last 24 Hrs  T(C): 37.2 (28 Sep 2019 07:56), Max: 37.5 (28 Sep 2019 03:17)  T(F): 98.9 (28 Sep 2019 07:56), Max: 99.5 (28 Sep 2019 03:17)  HR: 89 (28 Sep 2019 07:56) (89 - 98)  BP: 118/76 (28 Sep 2019 07:56) (118/76 - 140/76)  BP(mean): --  RR: 18 (28 Sep 2019 07:56) (18 - 20)  SpO2: 100% (28 Sep 2019 07:56) (99% - 100%)                          12.1   12.8  )-----------( 321      ( 28 Sep 2019 05:08 )             34.8    09-28    133<L>  |  100  |  8   ----------------------------<  178<H>  4.0   |  25  |  0.68    Ca    8.6      28 Sep 2019 05:08    TPro  7.0  /  Alb  3.9  /  TBili  0.3  /  DBili  x   /  AST  10  /  ALT  24  /  AlkPhos  55  09-28       PHYSICAL EXAM:  Gen: NAD  Skin: No rashes, bruises, or lesions  Head: Normocephalic, Atraumatic  Face: no edema, erythema, or fluctuance. Parotid glands soft without mass  Eyes: no scleral injection  Ears: Right - ear canal clear, TM intact without effusion or erythema. No evidence of any fluid drainage. No mastoid tenderness, erythema, or ear bulging            Left - Pain upon Tragus pulling, TTP Pre- auricular area,  EAC Edema with mild purulent drainage, unable to fully visualize TM due to edema, No mastoid tenderness, erythema, or ear bulging.  Nose: B/l nasal congestion.   Mouth: No Stridor / Drooling / Trismus.  Mucosa moist, tongue/uvula midline, oropharynx clear  Neck: Flat, supple, no lymphadenopathy, trachea midline, no masses  Lymphatic: No lymphadenopathy  Resp: breathing easily, no stridor  CV: no peripheral edema/cyanosis  GI: nondistended   Peripheral vascular: no JVD or edema  Neuro: facial nerve intact, no facial droop    IMAGING/ADDITIONAL STUDIES:   CT IAC: No CC: Left Ear Pain.     HPI: 41 YO F with no significant PMH presents to ED with Left Ear Pain. As per pt, pain started in 9/6, went to Urgent Care at that time, was prescribed Ocuflox 0.3% gtt. Pt finished course, and felt better. Left ear pain again started this Thursday 9/26, ocuflox was prescribed by , was using 5gtt bid per day for 2 days. But the pain got worse, wasn't controlled by Tylenol/Motrin. Pt took a dose of oxycodone for pain at home. Also feeling " wet sensation" inside the Left ear. Denies any purulent discharge. Pt had a 100.2F fever at home. As per pt, pain constant, with intermittent bursts. Denies Q Tip use, any recent swimming, URI, sinus surgeries, Air plane travel. Admits to occasional seasonal allergies, takes OTC meds. Denies Tinnitus, vertigo, Dizziness, Nystagmus, Hearing loss, chills,  CP/SOB/Palpitations/Diaphoresis, HA/Dizziness/Blurry vision/ syncope, Abdominal Pain/N/V/D, recent travel/sick contacts.     In ED, pt WBC: 12.8. Temp of 99.5F at 0317am. Received Cipro 400mg IV X1, Morphine 4mg IV X1, Tylenol 975mg PO X1. CT IAC ordered.      PAST MEDICAL & SURGICAL HISTORY:  Migraines  No significant past surgical history    Allergies.   No Known Allergies    Intolerances.  MEDICATIONS  (STANDING):  MEDICATIONS  (PRN):    Social History: Denies smoking/alcohol/ drug use.   Family history: None.     ROS:   ENT: all negative except as noted in HPI   CV: denies palpitations  Pulm: denies SOB, cough, hemoptysis  GI: denies change in apetite, indigestion, n/v  : denies pertinent urinary symptoms, urgency  Neuro: denies numbness/tingling, loss of sensation  Psych: denies anxiety  MS: denies muscle weakness, instability  Heme: denies easy bruising or bleeding  Endo: denies heat/cold intolerance, excessive sweating  Vascular: denies LE edema    Vital Signs Last 24 Hrs  T(C): 37.2 (28 Sep 2019 07:56), Max: 37.5 (28 Sep 2019 03:17)  T(F): 98.9 (28 Sep 2019 07:56), Max: 99.5 (28 Sep 2019 03:17)  HR: 89 (28 Sep 2019 07:56) (89 - 98)  BP: 118/76 (28 Sep 2019 07:56) (118/76 - 140/76)  BP(mean): --  RR: 18 (28 Sep 2019 07:56) (18 - 20)  SpO2: 100% (28 Sep 2019 07:56) (99% - 100%)                          12.1   12.8  )-----------( 321      ( 28 Sep 2019 05:08 )             34.8    09-28    133<L>  |  100  |  8   ----------------------------<  178<H>  4.0   |  25  |  0.68    Ca    8.6      28 Sep 2019 05:08    TPro  7.0  /  Alb  3.9  /  TBili  0.3  /  DBili  x   /  AST  10  /  ALT  24  /  AlkPhos  55  09-28       PHYSICAL EXAM:  Gen: NAD  Skin: No rashes, bruises, or lesions  Head: Normocephalic, Atraumatic  Face: no edema, erythema, or fluctuance. Parotid glands soft without mass  Eyes: no scleral injection  Ears: Right - ear canal clear, TM intact without effusion or erythema. No evidence of any fluid drainage. No mastoid tenderness, erythema, or ear bulging          Left - Pain upon Tragus pulling, TTP Pre- auricular area,  EAC Edema with mild purulent drainage, unable to fully visualize TM due to edema, No mastoid tenderness, erythema, or ear bulging.  Nose: B/l nasal congestion.   Mouth: No Stridor / Drooling / Trismus.  Mucosa moist, tongue/uvula midline, oropharynx clear  Neck: Flat, supple, no lymphadenopathy, trachea midline, no masses  Lymphatic: No lymphadenopathy  Resp: breathing easily, no stridor  CV: no peripheral edema/cyanosis  GI: nondistended   Peripheral vascular: no JVD or edema  Neuro: facial nerve intact, no facial droop    IMAGING/ADDITIONAL STUDIES:   CT IAC: Soft tissue or fluid in the left external auditory canal and the left   middle ear cavity without bony erosion of the scutum or the ossicles.   This likely represents a combination of otitis externa and otitis media.   Although there is a small amount of fluid within the left mastoids there   is no coalescent mastoiditis. Soft tissue swelling overlying left ear and   mastoid tip are likely due to soft tissue cellulitis.

## 2019-10-01 ENCOUNTER — OTHER (OUTPATIENT)
Age: 29
End: 2019-10-01

## 2019-10-11 ENCOUNTER — MEDICATION RENEWAL (OUTPATIENT)
Age: 29
End: 2019-10-11

## 2019-10-23 ENCOUNTER — APPOINTMENT (OUTPATIENT)
Dept: INTERNAL MEDICINE | Facility: CLINIC | Age: 29
End: 2019-10-23

## 2019-10-31 PROCEDURE — 85610 PROTHROMBIN TIME: CPT

## 2019-10-31 PROCEDURE — 85027 COMPLETE CBC AUTOMATED: CPT

## 2019-10-31 PROCEDURE — 80053 COMPREHEN METABOLIC PANEL: CPT

## 2019-10-31 PROCEDURE — 86850 RBC ANTIBODY SCREEN: CPT

## 2019-10-31 PROCEDURE — 86900 BLOOD TYPING SEROLOGIC ABO: CPT

## 2019-10-31 PROCEDURE — 86901 BLOOD TYPING SEROLOGIC RH(D): CPT

## 2019-10-31 PROCEDURE — 90471 IMMUNIZATION ADMIN: CPT

## 2019-10-31 PROCEDURE — 12011 RPR F/E/E/N/L/M 2.5 CM/<: CPT

## 2019-10-31 PROCEDURE — 93005 ELECTROCARDIOGRAM TRACING: CPT

## 2019-10-31 PROCEDURE — 99285 EMERGENCY DEPT VISIT HI MDM: CPT | Mod: 25

## 2019-10-31 PROCEDURE — 71045 X-RAY EXAM CHEST 1 VIEW: CPT

## 2019-10-31 PROCEDURE — 85730 THROMBOPLASTIN TIME PARTIAL: CPT

## 2019-10-31 PROCEDURE — 90715 TDAP VACCINE 7 YRS/> IM: CPT

## 2019-10-31 PROCEDURE — 70486 CT MAXILLOFACIAL W/O DYE: CPT

## 2019-11-14 ENCOUNTER — APPOINTMENT (OUTPATIENT)
Dept: INTERNAL MEDICINE | Facility: CLINIC | Age: 29
End: 2019-11-14
Payer: MEDICAID

## 2019-11-14 DIAGNOSIS — L70.9 ACNE, UNSPECIFIED: ICD-10-CM

## 2019-11-14 PROCEDURE — 99213 OFFICE O/P EST LOW 20 MIN: CPT

## 2019-11-16 NOTE — HISTORY OF PRESENT ILLNESS
[Back Pain] : back pain [Doing Well] : doing well [Back Pain] : stable back pain [Pain Relief Sufficient to Make Difference] : pain relief is sufficient to make a difference [Same] : overall functioning same [Diversion] : diversion [None] : no drowsiness [Side Effects] : ~he/she~ denies medication side effects [Adherent] : the patient is adherent to ~his/her~ medication regimen [Goals--Doing Well] : the patient is doing well with ~his/her~ goals

## 2019-11-19 LAB — COMPREHENSIVE SCREEN URINE: NORMAL

## 2019-12-13 ENCOUNTER — APPOINTMENT (OUTPATIENT)
Dept: INTERNAL MEDICINE | Facility: CLINIC | Age: 29
End: 2019-12-13
Payer: MEDICAID

## 2019-12-13 PROCEDURE — 99213 OFFICE O/P EST LOW 20 MIN: CPT

## 2019-12-13 RX ORDER — BUSPIRONE HYDROCHLORIDE 10 MG/1
10 TABLET ORAL
Qty: 180 | Refills: 3 | Status: ACTIVE | COMMUNITY
Start: 2019-12-13 | End: 1900-01-01

## 2019-12-14 NOTE — HISTORY OF PRESENT ILLNESS
[Back Pain] : back pain [Doing Well] : doing well [Back Pain] : stable back pain [Pain Relief Sufficient to Make Difference] : pain relief is sufficient to make a difference [Same] : overall functioning same [None] : no drowsiness [Adherent] : the patient is adherent to ~his/her~ medication regimen [Side Effects] : ~he/she~ denies medication side effects [Diversion] : diversion [Goals--Doing Well] : the patient is doing well with ~his/her~ goals

## 2019-12-14 NOTE — ASSESSMENT
[FreeTextEntry1] : Review of use of pain medications seems appropriate. Able to maintain social and occupational functionality. Denies such side effects as excessive somnolence, constipation and pruritus.  No evidence or concern about aberrant behavior, including diverting or use for non-medical reasons.  Given ongoing pain issues, seems appropriate to continue regimen as is; tapering not feasible at this time, but will revisit in future as situation changes.

## 2019-12-20 LAB — COMPREHENSIVE SCREEN URINE: NORMAL

## 2020-01-07 ENCOUNTER — CHART COPY (OUTPATIENT)
Age: 30
End: 2020-01-07

## 2020-01-09 ENCOUNTER — MEDICATION RENEWAL (OUTPATIENT)
Age: 30
End: 2020-01-09

## 2020-02-16 NOTE — HISTORY OF PRESENT ILLNESS
[Back Pain] : back pain [Doing Well] : doing well [Pain Relief Sufficient to Make Difference] : pain relief is sufficient to make a difference [Back Pain] : stable back pain [Same] : sleep patterns same [None] : no mental cloudiness [Side Effects] : ~he/she~ denies medication side effects [Diversion] : diversion [Adherent] : the patient is adherent to ~his/her~ medication regimen [Goals--Doing Well] : the patient is doing well with ~his/her~ goals

## 2020-02-20 ENCOUNTER — APPOINTMENT (OUTPATIENT)
Dept: INTERNAL MEDICINE | Facility: CLINIC | Age: 30
End: 2020-02-20

## 2020-03-10 LAB — COMPREHENSIVE SCREEN URINE: NORMAL

## 2020-03-19 ENCOUNTER — APPOINTMENT (OUTPATIENT)
Dept: INTERNAL MEDICINE | Facility: CLINIC | Age: 30
End: 2020-03-19

## 2020-04-30 ENCOUNTER — APPOINTMENT (OUTPATIENT)
Dept: INTERNAL MEDICINE | Facility: CLINIC | Age: 30
End: 2020-04-30

## 2020-05-24 ENCOUNTER — EMERGENCY (EMERGENCY)
Facility: HOSPITAL | Age: 30
LOS: 1 days | Discharge: HOME | End: 2020-05-24
Attending: EMERGENCY MEDICINE
Payer: MEDICAID

## 2020-05-24 VITALS
RESPIRATION RATE: 20 BRPM | OXYGEN SATURATION: 97 % | DIASTOLIC BLOOD PRESSURE: 94 MMHG | HEART RATE: 66 BPM | SYSTOLIC BLOOD PRESSURE: 151 MMHG | TEMPERATURE: 99 F

## 2020-05-24 VITALS
DIASTOLIC BLOOD PRESSURE: 97 MMHG | HEIGHT: 73 IN | SYSTOLIC BLOOD PRESSURE: 156 MMHG | RESPIRATION RATE: 20 BRPM | HEART RATE: 70 BPM | OXYGEN SATURATION: 99 % | TEMPERATURE: 98 F | WEIGHT: 315 LBS

## 2020-05-24 DIAGNOSIS — S73.005A UNSPECIFIED DISLOCATION OF LEFT HIP, INITIAL ENCOUNTER: Chronic | ICD-10-CM

## 2020-05-24 PROCEDURE — 93010 ELECTROCARDIOGRAM REPORT: CPT

## 2020-05-24 PROCEDURE — 99284 EMERGENCY DEPT VISIT MOD MDM: CPT

## 2020-05-24 PROCEDURE — 99283 EMERGENCY DEPT VISIT LOW MDM: CPT

## 2020-05-24 PROCEDURE — 93005 ELECTROCARDIOGRAM TRACING: CPT

## 2020-05-24 RX ORDER — ONDANSETRON 8 MG/1
1 TABLET, FILM COATED ORAL
Qty: 12 | Refills: 0
Start: 2020-05-24 | End: 2020-05-27

## 2020-05-24 NOTE — ED PROVIDER NOTE - NSFOLLOWUPINSTRUCTIONS_ED_ALL_ED_FT
PLEASE RETURN TO THE EMERGENCY ROOM IF YOU WITHDRAWAL SYMPTOMS WORSEN OVER THE NEXT FEW DAYS AND YOU WOULD LIKE MEDICAL ATTENTION TO HELP WITH BODY ACHES/CHILLS/NAUSEA/ETC    WE ARE SENDING RM TO YOUR PHARMACY TO HELP WITH NAUSEA AT HOME IF YOU WOULD LIKE TO TRY TO AVOID A HOSPITAL VISIT    TAKE IBUPROFEN AND TYLENOL TO HELP WITH BODY ACHES AND BACK PAIN AT HOME, YOU CAN TAKE THESE AT THE SAME TIME     RETURN TO THE EMERGENCY ROOM IF ANY OTHER NEW CONCERNING SYMPTOMS DEVELOP    PLEASE FOLLOW UP WITH YOUR PRIMARY CARE DOCTOR FOR CONTINUED EVALUATION AND CARE

## 2020-05-24 NOTE — ED PROVIDER NOTE - PROGRESS NOTE DETAILS
Attending MD Haynes: EKG reviewed, QTc wnl.  STable for discharge. Follow up instructions given, importance of follow up emphasized, return to ED parameters reviewed and patient verbalized understanding.  All questions answered, all concerns addressed.

## 2020-05-24 NOTE — ED PROVIDER NOTE - CLINICAL SUMMARY MEDICAL DECISION MAKING FREE TEXT BOX
Pt w/ hx of chronic opioid abuse pw concern for impending withdrawal, currently w/out sx but afraid sx will start tmwr as his ppx was used up today. Advised pt on clonidine for opioid withdrawal sx relief, pt does not want at this time, advising against benzo use, pt very reasonable, plan to send zofran to pharmacy for nausea if it presents tmrw, and will dc w/ return precautions

## 2020-05-24 NOTE — ED ADULT TRIAGE NOTE - HEIGHT IN CM
185.42 Island Pedicle Flap Text: The defect edges were debeveled with a #15 scalpel blade.  Given the location of the defect, shape of the defect and the proximity to free margins an island pedicle advancement flap was deemed most appropriate.  Using a sterile surgical marker, an appropriate advancement flap was drawn incorporating the defect, outlining the appropriate donor tissue and placing the expected incisions within the relaxed skin tension lines where possible.    The area thus outlined was incised deep to adipose tissue with a #15 scalpel blade.  The skin margins were undermined to an appropriate distance in all directions around the primary defect and laterally outward around the island pedicle utilizing iris scissors.  There was minimal undermining beneath the pedicle flap.
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
## 2020-05-24 NOTE — ED PROVIDER NOTE - PATIENT PORTAL LINK FT
You can access the FollowMyHealth Patient Portal offered by [facility] by registering at the following website: [email or web address]. By joining [email or web address]’s FollowMyHealth portal, you will also be able to view your health information using other applications (apps) compatible with our system.
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
## 2020-05-24 NOTE — ED ADULT NURSE NOTE - OBJECTIVE STATEMENT
29 year old Male, PMH: back pain, cervical neck bulging-2010. Patient comes to the ER reporting nausea, feeling hot and chills. Patient reports "I feel like I am starting to withdraw from pain medication". Patient is prescribed oxycodone from MD Chowdhury for back pain, has been prescribed for several years on and off. 29 year old Male, PMH: back pain, cervical neck bulging-2010. Patient comes to the ER reporting nausea, feeling hot and chills. States "I feel like I am starting to withdraw from pain medication". Patient is prescribed oxycodone from MD Chow[facility] for back pain, has been prescribed for several years on and off. Patient states he has gone through withdrawal, never has been hospitalized for it. Had a prescription filled for meds but reports only getting half the usual count he normally does. Takes oxy daily, last taken today. A&Ox4, breathing spontaneous and unlabored, skin color normal for ethnicity. Denies shortness of breath, chest pain, abdominal pain, vomiting, diarrhea, headache, fever, numbness, tingling. Bed locked and in lowest position. Denies drug, alcohol, smoking use. 29 year old Male, PMH: back pain, cervical neck bulging-2010. Patient comes to the ER reporting nausea, feeling hot and chills. States "I feel like I am starting to withdraw from pain medication". Patient is prescribed oxycodone from [facility] for back pain, has been prescribed for several years on and off. Patient states he has gone through withdrawal, never has been hospitalized for it. Had a prescription filled for meds but reports only getting half the usual count he normally does. Takes oxy daily, last taken today, but less of a dose than usual. A&Ox4, breathing spontaneous and unlabored, skin color normal for ethnicity. Denies shortness of breath, chest pain, abdominal pain, vomiting, diarrhea, headache, fever, numbness, tingling. Bed locked and in lowest position. Denies drug, alcohol, smoking use.

## 2020-07-29 ENCOUNTER — APPOINTMENT (OUTPATIENT)
Dept: INTERNAL MEDICINE | Facility: CLINIC | Age: 30
End: 2020-07-29

## 2020-08-30 ENCOUNTER — INPATIENT (INPATIENT)
Facility: HOSPITAL | Age: 30
LOS: 0 days | Discharge: LEFT BEFORE DISCHARGE | DRG: 607 | End: 2020-08-30
Attending: HOSPITALIST | Admitting: HOSPITALIST
Payer: MEDICAID

## 2020-08-30 VITALS
TEMPERATURE: 98 F | RESPIRATION RATE: 18 BRPM | HEIGHT: 72 IN | HEART RATE: 97 BPM | OXYGEN SATURATION: 96 % | WEIGHT: 315 LBS | SYSTOLIC BLOOD PRESSURE: 156 MMHG | DIASTOLIC BLOOD PRESSURE: 84 MMHG

## 2020-08-30 VITALS
SYSTOLIC BLOOD PRESSURE: 144 MMHG | RESPIRATION RATE: 18 BRPM | OXYGEN SATURATION: 98 % | DIASTOLIC BLOOD PRESSURE: 78 MMHG | TEMPERATURE: 98 F | HEART RATE: 99 BPM

## 2020-08-30 DIAGNOSIS — L03.116 CELLULITIS OF LEFT LOWER LIMB: ICD-10-CM

## 2020-08-30 DIAGNOSIS — S73.005A UNSPECIFIED DISLOCATION OF LEFT HIP, INITIAL ENCOUNTER: Chronic | ICD-10-CM

## 2020-08-30 LAB
ALBUMIN SERPL ELPH-MCNC: 4 G/DL — SIGNIFICANT CHANGE UP (ref 3.3–5)
ALP SERPL-CCNC: 75 U/L — SIGNIFICANT CHANGE UP (ref 40–120)
ALT FLD-CCNC: 96 U/L — HIGH (ref 10–45)
ANION GAP SERPL CALC-SCNC: 10 MMOL/L — SIGNIFICANT CHANGE UP (ref 5–17)
AST SERPL-CCNC: 51 U/L — HIGH (ref 10–40)
BASOPHILS # BLD AUTO: 0.03 K/UL — SIGNIFICANT CHANGE UP (ref 0–0.2)
BASOPHILS NFR BLD AUTO: 0.4 % — SIGNIFICANT CHANGE UP (ref 0–2)
BILIRUB SERPL-MCNC: 0.4 MG/DL — SIGNIFICANT CHANGE UP (ref 0.2–1.2)
BUN SERPL-MCNC: 16 MG/DL — SIGNIFICANT CHANGE UP (ref 7–23)
CALCIUM SERPL-MCNC: 9.6 MG/DL — SIGNIFICANT CHANGE UP (ref 8.4–10.5)
CHLORIDE SERPL-SCNC: 104 MMOL/L — SIGNIFICANT CHANGE UP (ref 96–108)
CO2 SERPL-SCNC: 25 MMOL/L — SIGNIFICANT CHANGE UP (ref 22–31)
CREAT SERPL-MCNC: 1.06 MG/DL — SIGNIFICANT CHANGE UP (ref 0.5–1.3)
EOSINOPHIL # BLD AUTO: 0.18 K/UL — SIGNIFICANT CHANGE UP (ref 0–0.5)
EOSINOPHIL NFR BLD AUTO: 2.2 % — SIGNIFICANT CHANGE UP (ref 0–6)
GLUCOSE SERPL-MCNC: 127 MG/DL — HIGH (ref 70–99)
HCT VFR BLD CALC: 40.1 % — SIGNIFICANT CHANGE UP (ref 39–50)
HGB BLD-MCNC: 13.7 G/DL — SIGNIFICANT CHANGE UP (ref 13–17)
IMM GRANULOCYTES NFR BLD AUTO: 1.3 % — SIGNIFICANT CHANGE UP (ref 0–1.5)
LYMPHOCYTES # BLD AUTO: 2.28 K/UL — SIGNIFICANT CHANGE UP (ref 1–3.3)
LYMPHOCYTES # BLD AUTO: 27.9 % — SIGNIFICANT CHANGE UP (ref 13–44)
MCHC RBC-ENTMCNC: 30.5 PG — SIGNIFICANT CHANGE UP (ref 27–34)
MCHC RBC-ENTMCNC: 34.2 GM/DL — SIGNIFICANT CHANGE UP (ref 32–36)
MCV RBC AUTO: 89.3 FL — SIGNIFICANT CHANGE UP (ref 80–100)
MONOCYTES # BLD AUTO: 0.77 K/UL — SIGNIFICANT CHANGE UP (ref 0–0.9)
MONOCYTES NFR BLD AUTO: 9.4 % — SIGNIFICANT CHANGE UP (ref 2–14)
NEUTROPHILS # BLD AUTO: 4.8 K/UL — SIGNIFICANT CHANGE UP (ref 1.8–7.4)
NEUTROPHILS NFR BLD AUTO: 58.8 % — SIGNIFICANT CHANGE UP (ref 43–77)
NRBC # BLD: 0 /100 WBCS — SIGNIFICANT CHANGE UP (ref 0–0)
NT-PROBNP SERPL-SCNC: 5 PG/ML — SIGNIFICANT CHANGE UP (ref 0–300)
PLATELET # BLD AUTO: 221 K/UL — SIGNIFICANT CHANGE UP (ref 150–400)
POTASSIUM SERPL-MCNC: 4.6 MMOL/L — SIGNIFICANT CHANGE UP (ref 3.5–5.3)
POTASSIUM SERPL-SCNC: 4.6 MMOL/L — SIGNIFICANT CHANGE UP (ref 3.5–5.3)
PROT SERPL-MCNC: 6.8 G/DL — SIGNIFICANT CHANGE UP (ref 6–8.3)
RBC # BLD: 4.49 M/UL — SIGNIFICANT CHANGE UP (ref 4.2–5.8)
RBC # FLD: 12.7 % — SIGNIFICANT CHANGE UP (ref 10.3–14.5)
SARS-COV-2 RNA SPEC QL NAA+PROBE: SIGNIFICANT CHANGE UP
SODIUM SERPL-SCNC: 139 MMOL/L — SIGNIFICANT CHANGE UP (ref 135–145)
TROPONIN T, HIGH SENSITIVITY RESULT: 8 NG/L — SIGNIFICANT CHANGE UP (ref 0–51)
WBC # BLD: 8.17 K/UL — SIGNIFICANT CHANGE UP (ref 3.8–10.5)
WBC # FLD AUTO: 8.17 K/UL — SIGNIFICANT CHANGE UP (ref 3.8–10.5)

## 2020-08-30 PROCEDURE — 96374 THER/PROPH/DIAG INJ IV PUSH: CPT | Mod: XU

## 2020-08-30 PROCEDURE — 93971 EXTREMITY STUDY: CPT | Mod: 26

## 2020-08-30 PROCEDURE — 99284 EMERGENCY DEPT VISIT MOD MDM: CPT | Mod: 25

## 2020-08-30 PROCEDURE — 83880 ASSAY OF NATRIURETIC PEPTIDE: CPT

## 2020-08-30 PROCEDURE — G0378: CPT

## 2020-08-30 PROCEDURE — 93010 ELECTROCARDIOGRAM REPORT: CPT

## 2020-08-30 PROCEDURE — 71275 CT ANGIOGRAPHY CHEST: CPT | Mod: 26

## 2020-08-30 PROCEDURE — U0003: CPT

## 2020-08-30 PROCEDURE — 85027 COMPLETE CBC AUTOMATED: CPT

## 2020-08-30 PROCEDURE — 71045 X-RAY EXAM CHEST 1 VIEW: CPT

## 2020-08-30 PROCEDURE — 93005 ELECTROCARDIOGRAM TRACING: CPT

## 2020-08-30 PROCEDURE — 71045 X-RAY EXAM CHEST 1 VIEW: CPT | Mod: 26

## 2020-08-30 PROCEDURE — 84484 ASSAY OF TROPONIN QUANT: CPT

## 2020-08-30 PROCEDURE — 99285 EMERGENCY DEPT VISIT HI MDM: CPT

## 2020-08-30 PROCEDURE — 71275 CT ANGIOGRAPHY CHEST: CPT

## 2020-08-30 PROCEDURE — 93971 EXTREMITY STUDY: CPT

## 2020-08-30 PROCEDURE — 80053 COMPREHEN METABOLIC PANEL: CPT

## 2020-08-30 RX ADMIN — Medication 100 MILLIGRAM(S): at 17:26

## 2020-08-30 NOTE — ED PROVIDER NOTE - ATTENDING CONTRIBUTION TO CARE
I performed a history and physical exam of the patient and discussed their management with the resident.  I reviewed the resident's note and agree with the documented findings and plan of care except as noted below. My medical decision making and observations are as follows:    30M pmh prior lasix use when he was a kid; presents with LLE redness and swelling over the last 2 week. Pt reports prior episodes of cellulitis of the LE, thought this episode was the same thing. Went to urgent care who started him on Bactrim (completed 5/7 day course) with no benefit, then went to PMD who started him on cefodroxil still with no imrpovement.  Pt never had f/c, nausea/vomiting/diarrhea.  Reports that redness has not spread but has not gotten better and swelling has gotten progressively worse.  No chest pain, shortness of breath.  Pt with assymetric swelling of LLE with associated warmth and redness, heart mildly tachycardic, lungs cta.  Concern for possible DVT, ?PE given tachycardia, chf, could still be cellulitis.  will check labs, cta, duplex to r/o dvt and reassess.

## 2020-08-30 NOTE — ED PROVIDER NOTE - NS ED ROS FT
Constitution: No Fever or chills, No Weight Loss,   Eyes: No visual changes  HEENT: No cough, No Discharge, No Rhinorrhea, No URI symptoms  Cardio: No Chest pain, No Palpitations, (+) Dyspnea  Resp: (+) SOB, No Wheezing  GI: No abdominal pain, No Nausea, No Vomiting, No Constipation, No Diarrhea  : No burning upon urination, trouble urinating, no foul odor from urine  MSK: (+) LLE Swelling; No Numbness, No Tingling, No Weakness  Neuro: No Headache, No changes to Vision, No changes to Hearing, Normal Gait  Skin: No rashes, No Bruising, No Swelling
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
## 2020-08-30 NOTE — ED PROVIDER NOTE - NSFOLLOWUPINSTRUCTIONS_ED_ALL_ED_FT
You were seen and evaluated in the Emergency Department for your Left lower Extremity swelling. At this time, clinical evaluation and history demonstrate life-threatening symptoms that may require further medical care with admission for IV antibiotics.     You decided to leave against the medical advice of your doctors and providers - please understand that this decision may pose a significant risk to your health including the risk of sepsis, bacteremia, death, and loss of limb/life.    Please take the oral antibiotics we prescribed to you, and follow up with Dr. Chowdhury within the next 24-28 hours.

## 2020-08-30 NOTE — ED ADULT TRIAGE NOTE - CHIEF COMPLAINT QUOTE
L leg infection, redness, swelling; started on abx on UC and PCP, states "I feel like the infection is not going away"

## 2020-08-30 NOTE — ED PROVIDER NOTE - CARE PROVIDER_API CALL
Jemal Chowdhury  INTERNAL MEDICINE  [address]  [place] [address]  Phone: [phone]  Fax: [phone]  Follow Up Time:

## 2020-08-30 NOTE — ED PROVIDER NOTE - PHYSICAL EXAMINATION
GEN - NAD; morbidly obese; A+Ox3   HEAD - NC/AT, No visible Ecchymosis, No Abrasions, No Lacerations/Skin Tears     EYES - EOMI, no conjunctival pallor, no scleral icterus  PULM - CTA B/L,  symmetric breath sounds  COR -  Tachy, S1 S2, no murmurs  ABD - NT/ND, soft, no guarding, no rebound, no masses    EXTREMS - 3 + edema B/L (LLE > swollen > RLE), warm and well perfused  SKIN - LLE with demonstration of warmth, erythema - and vesicular papules in a linear derivation

## 2020-08-30 NOTE — ED PROVIDER NOTE - PATIENT PORTAL LINK FT
You can access the FollowMyHealth Patient Portal offered by [facility] by registering at the following website: [email or web address]. By joining [facility]’s FollowMyHealth portal, you will also be able to view your health information using other applications (apps) compatible with our system.

## 2020-08-30 NOTE — ED PROVIDER NOTE - PROGRESS NOTE DETAILS
Resident Seema: pt's clinical course and evaluation is complicated. Initial presentation and evaluation of patient with demonstration of LLE Swelling > RLE, evidence of erythema and patient endorsement of SOB and VSS with demonstration of low grade Tachycardia 100-110 BPM. High-Moderate Well's, so patient obtained CTA Chest and DVT Study to r/o DVT and PE. DVT and PE effectively ruled out. Cellulitis (refractory to outpatient antibiotics) vs. Contact Dermatitis on differential - LLE swelling is with demonstration of vesicles in a linear derivation which is more consistent with Contact Derm and patient is afebrile, without leukocytosis or left shift. PMD was called to see if we can facilitate close outpatient followup for patient; Jemal Chowdhury MD not available, call deferred to on call physician (MD Noe [phone]). Dr. Liu reports she cannot facilitate outpatient close follow up. Will admit to hospitalist. Resident Seema: pt informed me that he would like to leave and does not want to stay for admission. Pt was made aware of risks, benefits, and treatment alternatives to treatment of his presenting symptoms. Acknowledges risk of deferring admission includes bacteremia, sepsis, death, and potential loss of limb and or life. Will AMA home with PO Clindamycin and PMD followup.

## 2020-08-30 NOTE — ED ADULT NURSE NOTE - CHPI ED NUR SYMPTOMS NEG
no chills/no decreased eating/drinking/no fever/no nausea/no weakness/no vomiting/no tingling/no dizziness

## 2020-08-30 NOTE — ED ADULT NURSE NOTE - OBJECTIVE STATEMENT
29 yo M aaox4 c/o of leg pain. redness and swelling. Pt reports starting antibiotics outpt but has seen no improvement in symptoms. Denies fever or chills. Provider at bedside evaluating. No sign of acute distress.

## 2020-08-30 NOTE — ED PROVIDER NOTE - CLINICAL SUMMARY MEDICAL DECISION MAKING FREE TEXT BOX
Exam, presentation, and history concerning for LLE Swelling refractory to multiple course of antibiotics - likely in the setting of contact dermatitis (papular vesicles in a linear derivation), less consistent with cellulitis (not firm, not indurated or erythematous), however overall picture is additionally concerning for Heart Failure vs. PE (pt is Tachycardic to 110's; 2+ B/L LE Pitting Edema, and pt is SOB). Plan: CBC, CMP, EKG, CXR, CTA Chest (Moderate-High Risk Well's), DVT Study, Trop, BNP.

## 2020-09-05 ENCOUNTER — EMERGENCY (EMERGENCY)
Facility: HOSPITAL | Age: 30
LOS: 1 days | End: 2020-09-05
Attending: EMERGENCY MEDICINE
Payer: MEDICAID

## 2020-09-05 VITALS
SYSTOLIC BLOOD PRESSURE: 131 MMHG | DIASTOLIC BLOOD PRESSURE: 88 MMHG | TEMPERATURE: 99 F | HEIGHT: 72 IN | HEART RATE: 85 BPM | OXYGEN SATURATION: 96 % | WEIGHT: 315 LBS | RESPIRATION RATE: 20 BRPM

## 2020-09-05 DIAGNOSIS — S73.005A UNSPECIFIED DISLOCATION OF LEFT HIP, INITIAL ENCOUNTER: Chronic | ICD-10-CM

## 2020-09-05 LAB
ALBUMIN SERPL ELPH-MCNC: 4.2 G/DL — SIGNIFICANT CHANGE UP (ref 3.3–5)
ALP SERPL-CCNC: 78 U/L — SIGNIFICANT CHANGE UP (ref 40–120)
ALT FLD-CCNC: 78 U/L — HIGH (ref 10–45)
ANION GAP SERPL CALC-SCNC: 10 MMOL/L — SIGNIFICANT CHANGE UP (ref 5–17)
AST SERPL-CCNC: 40 U/L — SIGNIFICANT CHANGE UP (ref 10–40)
BASOPHILS # BLD AUTO: 0.04 K/UL — SIGNIFICANT CHANGE UP (ref 0–0.2)
BASOPHILS NFR BLD AUTO: 0.5 % — SIGNIFICANT CHANGE UP (ref 0–2)
BILIRUB SERPL-MCNC: 0.3 MG/DL — SIGNIFICANT CHANGE UP (ref 0.2–1.2)
BUN SERPL-MCNC: 17 MG/DL — SIGNIFICANT CHANGE UP (ref 7–23)
CALCIUM SERPL-MCNC: 10.1 MG/DL — SIGNIFICANT CHANGE UP (ref 8.4–10.5)
CHLORIDE SERPL-SCNC: 103 MMOL/L — SIGNIFICANT CHANGE UP (ref 96–108)
CO2 SERPL-SCNC: 29 MMOL/L — SIGNIFICANT CHANGE UP (ref 22–31)
CREAT SERPL-MCNC: 1.02 MG/DL — SIGNIFICANT CHANGE UP (ref 0.5–1.3)
CRP SERPL-MCNC: 1.08 MG/DL — HIGH (ref 0–0.4)
EOSINOPHIL # BLD AUTO: 0.18 K/UL — SIGNIFICANT CHANGE UP (ref 0–0.5)
EOSINOPHIL NFR BLD AUTO: 2.3 % — SIGNIFICANT CHANGE UP (ref 0–6)
GLUCOSE SERPL-MCNC: 112 MG/DL — HIGH (ref 70–99)
HCT VFR BLD CALC: 39.6 % — SIGNIFICANT CHANGE UP (ref 39–50)
HGB BLD-MCNC: 13.3 G/DL — SIGNIFICANT CHANGE UP (ref 13–17)
IMM GRANULOCYTES NFR BLD AUTO: 1.3 % — SIGNIFICANT CHANGE UP (ref 0–1.5)
LYMPHOCYTES # BLD AUTO: 1.79 K/UL — SIGNIFICANT CHANGE UP (ref 1–3.3)
LYMPHOCYTES # BLD AUTO: 23.3 % — SIGNIFICANT CHANGE UP (ref 13–44)
MCHC RBC-ENTMCNC: 29.7 PG — SIGNIFICANT CHANGE UP (ref 27–34)
MCHC RBC-ENTMCNC: 33.6 GM/DL — SIGNIFICANT CHANGE UP (ref 32–36)
MCV RBC AUTO: 88.4 FL — SIGNIFICANT CHANGE UP (ref 80–100)
MONOCYTES # BLD AUTO: 0.61 K/UL — SIGNIFICANT CHANGE UP (ref 0–0.9)
MONOCYTES NFR BLD AUTO: 7.9 % — SIGNIFICANT CHANGE UP (ref 2–14)
NEUTROPHILS # BLD AUTO: 4.97 K/UL — SIGNIFICANT CHANGE UP (ref 1.8–7.4)
NEUTROPHILS NFR BLD AUTO: 64.7 % — SIGNIFICANT CHANGE UP (ref 43–77)
NRBC # BLD: 0 /100 WBCS — SIGNIFICANT CHANGE UP (ref 0–0)
PLATELET # BLD AUTO: 234 K/UL — SIGNIFICANT CHANGE UP (ref 150–400)
POTASSIUM SERPL-MCNC: 3.9 MMOL/L — SIGNIFICANT CHANGE UP (ref 3.5–5.3)
POTASSIUM SERPL-SCNC: 3.9 MMOL/L — SIGNIFICANT CHANGE UP (ref 3.5–5.3)
PROT SERPL-MCNC: 7.3 G/DL — SIGNIFICANT CHANGE UP (ref 6–8.3)
RBC # BLD: 4.48 M/UL — SIGNIFICANT CHANGE UP (ref 4.2–5.8)
RBC # FLD: 12.7 % — SIGNIFICANT CHANGE UP (ref 10.3–14.5)
SODIUM SERPL-SCNC: 142 MMOL/L — SIGNIFICANT CHANGE UP (ref 135–145)
WBC # BLD: 7.69 K/UL — SIGNIFICANT CHANGE UP (ref 3.8–10.5)
WBC # FLD AUTO: 7.69 K/UL — SIGNIFICANT CHANGE UP (ref 3.8–10.5)

## 2020-09-05 PROCEDURE — 99218: CPT

## 2020-09-05 RX ORDER — VANCOMYCIN HCL 1 G
1000 VIAL (EA) INTRAVENOUS EVERY 12 HOURS
Refills: 0 | Status: DISCONTINUED | OUTPATIENT
Start: 2020-09-05 | End: 2020-09-09

## 2020-09-05 RX ADMIN — Medication 100 MILLIGRAM(S): at 23:52

## 2020-09-05 RX ADMIN — Medication 110 MILLIGRAM(S): at 22:35

## 2020-09-05 NOTE — ED ADULT NURSE NOTE - ED STAT RN HANDOFF DETAILS
Handoff report provided to Nora RN. Understands pmh, medications given and plan of care for patient. Patient in stable condition, vital signs updated, has no complaints at this time and has been updated on care plan. Explained to patient  new nurse is taking over, pt verbalized understanding.

## 2020-09-05 NOTE — ED CDU PROVIDER INITIAL DAY NOTE - OBJECTIVE STATEMENT
30 male, hx: prior lasix use when he was a kid; presents with LLE swelling over the last 2 week. Pt reports prior episodes of cellulitis of the LE, thought this episode was the same thing. Went to PMD who started him on Bactrim (completed 5/7 day course) with no derived benefit, PMD then started him on 3rd Gen Cephalosporin completed an additional course - with still no benefit. Denies any fevers, chills, nausea, vomiting, diarrhea, constipation. Reports some baseline SOB, especially when laying flat. Denies any prior history of PE/DVT.     	Additionally reports that the swelling of the LLE developed shortly after patient was moving a friends car from a parking lot with questionable poison ivy. 30  year old male with no sig pmhx presents to ED c/o worsened LLE swelling over the last 2 weeks. Pt reports approx 2 weeks ago cut LLE while moving furniture. Pt then developed redness and swelling to the LLE and went to urgent care and was started on Bactrim (completed 5/7 day course) with no improvement. Pt then saw PMD then started him on 3rd Gen Cephalosporin completed an additional course - with still no benefit. Pt was then seen in ED 8/30 and started on IV Clinda but signed self out opting to take oral abx. Pt was compliant with medications. 2 days ago (day 5/7 of Clindamycin) noticed increased redness and swelling to the LLE with mild redness noted to the RLE yesterday causing pt to come to ED for reeval. Reports  Denies any fevers, chills, chest pain, sob, jordan, cough, abd pain, nausea, vomiting, diarrhea, constipation. Pt denies recent tattoo work to LE, denies IVDU     In ED patient labs overall un-actionable . Pt w/out leukocytosis, CRP elevated at 1.08. Pt received dose of Doxy with plan to be observed overnight for improvement. CDU called ID for recs/consult given multiple courses abx. They recommended Vanco 1g q 12 while observed and asked to obtain MRSA nasal PCR as well and will see patient formally in am.

## 2020-09-05 NOTE — ED PROVIDER NOTE - NS ED ROS FT
General: denies fever, chills  HENT: denies nasal congestion, sore throat, rhinorrhea  Eyes: denies vision changes  CV: denies chest pain  Resp: denies difficulty breathing, cough  Abdominal: denies nausea, vomiting, diarrhea, abdominal pain, blood in stool, dark stool  : denies pain with urination  MSK: denies recent trauma  Neuro: denies headaches, numbness, tingling, dizziness, lightheadedness.  Skin: +LLE rash   Endocrine: denies recent weight loss

## 2020-09-05 NOTE — ED CDU PROVIDER INITIAL DAY NOTE - PHYSICAL EXAMINATION
CONSTITUTIONAL: Patient is awake, alert and oriented x 3. Patient is well appearing and in no acute distress.  HEAD: NCAT,  NECK: supple, FROM  LUNGS: CTA B/L,  HEART: RRR.   ABDOMEN: Soft nd/nttp   EXTREMITY: b/l equal LE edema. FROM upper and lower ext b/l  SKIN: LLE: there is warmth and redness to the left shin greatest to the medial aspect, spreading circumferentially   NEURO: Cn3-12 grossly intact. Strength5/5UE/LE.NmlSensation.Gait normal. CONSTITUTIONAL: Patient is awake, alert and oriented x 3. Patient is well appearing and in no acute distress.  HEAD: NCAT,  NECK: supple, FROM  LUNGS: CTA B/L,  HEART: RRR.   ABDOMEN: Soft nd/nttp   EXTREMITY: b/l equal LE edema. FROM upper and lower ext b/l  SKIN: LLE: there is warmth and redness to the left shin/calf greatest to the medial aspect, spreading circumferentially. Erythema over distal 2/3 of leg sparing ankle and foot with overlying warmth and mild ttp. RLE: there are scattered small raised erythematous lesions and pustules over right shin and calf with faint surrounding erythema, no overlying warmth or ttp     NEURO: Cn3-12 grossly intact. Strength5/5UE/LE.NmlSensation.Gait normal.

## 2020-09-05 NOTE — ED CDU PROVIDER INITIAL DAY NOTE - MEDICAL DECISION MAKING DETAILS
Monica Meraz MD - Attending Physician: Pt here with worsening cellulitis of L leg after initial improvement on Clinda. No fever. Well appearing, but given failed outpt abx, will place in CDU for IV abx, monitoring improvement and possible ID c/s

## 2020-09-05 NOTE — ED CDU PROVIDER INITIAL DAY NOTE - PROGRESS NOTE DETAILS
CDU NOTE AVELINO Hernandez: VSS NAD. Patient is resting comfortably and is without any complaints. Patient arrived in CDU. Erythema on LE demarcated and nares swabbed per ID request.

## 2020-09-05 NOTE — ED PROVIDER NOTE - CLINICAL SUMMARY MEDICAL DECISION MAKING FREE TEXT BOX
Monica Meraz MD - Attending Physician: Pt here with recurrent cellulitis of L leg, initial improved s/p Bactrim then Clinda PO, now worsening again after completed oral Clinda course. Also with folliculitis of R leg now. Afebrile, ambulatory. Labs, IV abx, CDU for obs for improvement

## 2020-09-05 NOTE — ED CDU PROVIDER INITIAL DAY NOTE - ATTENDING CONTRIBUTION TO CARE
Monica Meraz MD - Attending Physician: I have personally seen and examined this patient. I have discussed the case with the ACP. I have reviewed all pertinent clinical information, including history, physical exam, plan and the ACP’s note and agree except as noted. See MDM

## 2020-09-05 NOTE — ED ADULT NURSE NOTE - OBJECTIVE STATEMENT
31 YO male with no stated PMH, via walk in presenting with complaints of lower leg redness. As per patient, 3 weeks ago, pt accidentally cut left lower extremity on bed, pt denies any initial redness or swelling. Pt states 2 weeks ago, pt noted redness and swelling to the left lower extremity. Pt went to urgent care and was prescribed PO antibiotics. Pt states that redness and swelling had not improved so patient had come to the ED on 8/30/2020 where blood work was obtained and patient was initiated on IV antibiotics and discharged home. Pt states redness and swelling to the left lower leg had initially improved, but then redness and swelling returned. Pt denies chest pain, shortness of breath, visual disturbances, numbness/tingling, fever, chills, diaphoresis, headache, nausea, vomiting, constipation, diarrhea, or urinary symptoms.   Pt Axox4, gross neuro intact, PERRL 3 mm. Lungs clear throughout bilateral. S1S2 heard. Abdomen soft, non-tender, non-distended. Skin warm, dry, and intact, besides redness, warmth, swelling, and soreness to the left lower leg, and redness to the right lower leg. Pt placed in position of comfort. Pt educated on call bell system and provided call bell. Bed in lowest position, wheels locked, appropriate side rails raised. Pt denies needs at this time.

## 2020-09-05 NOTE — ED PROVIDER NOTE - ATTENDING CONTRIBUTION TO CARE
Monica Meraz MD - Attending Physician: I have personally seen and examined this patient with the resident/fellow.  I have fully participated in the care of this patient. I have reviewed all pertinent clinical information, including history, physical exam, plan and the Resident/Fellow’s note and agree except as noted. See MDM

## 2020-09-05 NOTE — ED PROVIDER NOTE - PHYSICAL EXAMINATION
CONSTITUTIONAL: Nontoxic, well nourished, well developed, middle-aged male, resting comfortably in no acute distress  HEAD: Normocephalic; atraumatic  EYES: Normal inspection, EOMI  ENMT: External appears normal; normal oropharynx  NECK: Supple; non-tender; no cervical lymphadenopathy  CARD: RRR; no audible murmurs, rubs, or gallops  RESP: No respiratory distress, lungs ctab/l  ABD: Soft, non-distended; non-tender; no rebound or guarding  EXT: 2+ b/l LE pitting edema, no calf tenderness; distal pulses intact with good capillary refill  SKIN: LLE calf erythema with poorly defined borders that is warmth to touch and blanching with old lateral calf scar well healed. RLE with mild erythema and scattered pustules   NEURO: aaox3, moving all extremities spontaneously CONSTITUTIONAL: Nontoxic, well nourished, well developed, middle-aged male, resting comfortably in no acute distress  HEAD: Normocephalic; atraumatic  EYES: Normal inspection, EOMI  ENMT: External appears normal; normal oropharynx  NECK: Supple; non-tender; no cervical lymphadenopathy  CARD: RRR; no audible murmurs, rubs, or gallops  RESP: No respiratory distress, lungs ctab/l  ABD: Soft, non-distended; non-tender; no rebound or guarding  EXT: 2+ b/l LE pitting edema, no calf tenderness; distal pulses intact with good capillary refill  SKIN: LLE anterior erythema with poorly defined borders that is warmth to touch and blanching with old lateral calf scar well healed. +Edema to LLE to knee. RLE with minimal erythema and scattered pustules   NEURO: aaox3, moving all extremities spontaneously

## 2020-09-05 NOTE — ED PROVIDER NOTE - OBJECTIVE STATEMENT
Ayde Vidales MD: 29yo M with no significant PMH who presents with worsening LLE swelling over the last 3 weeks after injury to leg from hitting bed frame. Pt was evaluated by PMD initially and completed day 5/7 of Bactrim with no improvement and subsequently started by PMD on cefadroxil with no benefit. Was seen on 8/30 and started on IV clindamycin with improvement of redness, swelling the day after when pt declined admission and was put on PO clindamycin and completed it with return of redness and swelling. States that his RLE has also some redness and swelling. Pt was r/o for DVT and PE with US and CTA chest on last visit. Denies any fevers, chills, nausea, vomiting, SOB, CP, diarrhea, URI symptoms.

## 2020-09-06 VITALS
TEMPERATURE: 98 F | SYSTOLIC BLOOD PRESSURE: 131 MMHG | DIASTOLIC BLOOD PRESSURE: 72 MMHG | RESPIRATION RATE: 16 BRPM | HEART RATE: 82 BPM | OXYGEN SATURATION: 97 %

## 2020-09-06 LAB
ERYTHROCYTE [SEDIMENTATION RATE] IN BLOOD: 29 MM/HR — HIGH (ref 0–15)
MRSA PCR RESULT.: SIGNIFICANT CHANGE UP
S AUREUS DNA NOSE QL NAA+PROBE: SIGNIFICANT CHANGE UP
SARS-COV-2 RNA SPEC QL NAA+PROBE: SIGNIFICANT CHANGE UP

## 2020-09-06 PROCEDURE — 99284 EMERGENCY DEPT VISIT MOD MDM: CPT | Mod: GC

## 2020-09-06 PROCEDURE — 99217: CPT

## 2020-09-06 PROCEDURE — 85652 RBC SED RATE AUTOMATED: CPT

## 2020-09-06 PROCEDURE — 96367 TX/PROPH/DG ADDL SEQ IV INF: CPT

## 2020-09-06 PROCEDURE — 83036 HEMOGLOBIN GLYCOSYLATED A1C: CPT

## 2020-09-06 PROCEDURE — G0378: CPT

## 2020-09-06 PROCEDURE — 87640 STAPH A DNA AMP PROBE: CPT

## 2020-09-06 PROCEDURE — 99284 EMERGENCY DEPT VISIT MOD MDM: CPT | Mod: 25

## 2020-09-06 PROCEDURE — 86140 C-REACTIVE PROTEIN: CPT

## 2020-09-06 PROCEDURE — 87641 MR-STAPH DNA AMP PROBE: CPT

## 2020-09-06 PROCEDURE — 96365 THER/PROPH/DIAG IV INF INIT: CPT

## 2020-09-06 PROCEDURE — 85027 COMPLETE CBC AUTOMATED: CPT

## 2020-09-06 PROCEDURE — 80053 COMPREHEN METABOLIC PANEL: CPT

## 2020-09-06 RX ADMIN — Medication 250 MILLIGRAM(S): at 01:11

## 2020-09-06 RX ADMIN — Medication 1000 MILLIGRAM(S): at 02:11

## 2020-09-06 NOTE — ED ADULT NURSE REASSESSMENT NOTE - GENERAL PATIENT STATE
comfortable appearance/cooperative
smiling/interactive/resting/sleeping/cooperative/improvement verbalized/comfortable appearance

## 2020-09-06 NOTE — ED ADULT NURSE REASSESSMENT NOTE - NS ED NURSE REASSESS COMMENT FT1
CDU advanced care provider at bedside assessing patient for observation area. Pt placed in position of comfort. Pt educated on call bell system and provided call bell. Bed in lowest position, wheels locked, appropriate side rails raised. Pt denies needs at this time.
Received pt from MAIRA Almaguer , received pt alert and responsive, oriented x4, denies any respiratory distress, SOB, or difficulty breathing. Pt transferred to CDU for B/L LE redness, swelling. Pt is currently pain free. Pt to receive IV Vancomycin to be given q12, initial dose given, tolerating well.  IV in place, patent and free of signs of infiltration, V/S stable, pt afebrile.  Pt educated on unit and unit rules, instructed patient to notify RN of any needed assistance, Pt verbalizes understanding, Call bell placed within reach. Safety maintained. Will continue to monitor.

## 2020-09-06 NOTE — ED CDU PROVIDER DISPOSITION NOTE - CLINICAL COURSE
30  year old male with no sig pmhx presents to ED c/o worsened LLE swelling over the last 2 weeks. Pt reports approx 2 weeks ago cut LLE while moving furniture. Pt then developed redness and swelling to the LLE and went to urgent care and was started on Bactrim (completed 5/7 day course) with no improvement. Pt then saw PMD then started him on 3rd Gen Cephalosporin completed an additional course - with still no benefit. Pt was then seen in ED 8/30 and started on IV Clinda but signed self out opting to take oral abx. Pt was compliant with medications. 2 days ago (day 5/7 of Clindamycin) noticed increased redness and swelling to the LLE with mild redness noted to the RLE yesterday causing pt to come to ED for reeval. Reports  Denies any fevers, chills, chest pain, sob, jordan, cough, abd pain, nausea, vomiting, diarrhea, constipation.     In ED patient labs overall un-actionable . Pt w/out leukocytosis, CRP elevated at 1.08. Pt received dose of Doxy with plan to be observed overnight for improvement. CDU called ID for recs/consult given multiple courses abx. They recommended Vanco 1g q 12 while observed and asked to obtain MRSA nasal PCR as well and will see patient formally in am.  Overnight patient.... Seen by ID in am who recommended 30  year old male with no sig pmhx presents to ED c/o worsened LLE swelling over the last 2 weeks. Pt reports approx 2 weeks ago cut LLE while moving furniture. Pt then developed redness and swelling to the LLE and went to urgent care and was started on Bactrim (completed 5/7 day course) with no improvement. Pt then saw PMD then started him on 3rd Gen Cephalosporin completed an additional course - with still no benefit. Pt was then seen in ED 8/30 and started on IV Clinda but signed self out opting to take oral abx. Pt was compliant with medications. 2 days ago (day 5/7 of Clindamycin) noticed increased redness and swelling to the LLE with mild redness noted to the RLE yesterday causing pt to come to ED for reeval. Reports  Denies any fevers, chills, chest pain, sob, jordan, cough, abd pain, nausea, vomiting, diarrhea, constipation.     In ED patient labs overall un-actionable . Pt w/out leukocytosis, CRP elevated at 1.08. Pt received dose of Doxy with plan to be observed overnight for improvement. CDU called ID for recs/consult given multiple courses abx. They recommended Vanco 1g q 12 while observed and asked to obtain MRSA nasal PCR as well and will see patient formally in am.  Overnight patient had symptomatic improvement in both swelling and redness to both his legs. He was seen by ID attending Dr. Ramirez in am who recommended discharge home with Doxycycline 100 mg twice daily for 7 days and outpatient follow up in the ID office this week. Pt evaluated by ED attending Dr. Preciado who cleared him for discharge home. Stable at d/c.

## 2020-09-06 NOTE — CONSULT NOTE ADULT - ATTENDING COMMENTS
30 year old male with LLE cellulitis. Patient states injured his left leg hitting it against a bed frame. Took bactrim as outpatient, then changed to cefadroxil because no improvement. Came to the ED on 8/30, improved on IV clinda, so discharged on clindamycin. Now on IV doxycycline with improvement. Also, received vancomycin IV in the ED. WBC WNL. Afebrile. Can discharge on doxycycline 100 mg PO BID, informed patient regarding but limited to side effects including photosensitivity, esophagitis, and avoid dairy. Check HIV. F/U MRSA surveillance PCR. RML lung nodule - will need to f/u with PMD. Patient can followup in ID clinic on Wed 9/9 at 10:45 am. Clinic information given to the patient.    Lenny Ramirez MD  Pager [phone]  After 5pm/weekends call [phone]    Discussed plan with CDU team.

## 2020-09-06 NOTE — ED CDU PROVIDER SUBSEQUENT DAY NOTE - PHYSICAL EXAMINATION
CONSTITUTIONAL: Patient is awake, alert and oriented x 3. Patient is well appearing and in no acute distress.  HEAD: NCAT,  NECK: supple, FROM  LUNGS: CTA B/L,  HEART: RRR.   ABDOMEN: Soft nd/nttp   EXTREMITY: mild b/l equal LE edema. FROM upper and lower ext b/l  SKIN: LLE: there is mild warmth and redness to the left shin/calf greatest to the medial aspect.  Erythema receding with marked borders maintained within distal 2/3 of leg sparing ankle and foot.  RLE: there are small amount scattered small raised erythematous lesions and pustules over right shin and calf no overlying warmth or ttp     NEURO: No focal deficits

## 2020-09-06 NOTE — ED CDU PROVIDER SUBSEQUENT DAY NOTE - PROGRESS NOTE DETAILS
Patient seen at bedside in NAD.  VSS.  Patient resting comfortably without complaints. Reports swelling significantly improved from yesterday, reports was having trouble bending L leg at knee 2/2 swelling yesterday but now having no issues. Erythema to LLE appears improved from previously drawn borders on skin. RLE w/ few pustules to distal R shin w/ minimal surrounding erythema, all within previously drawn borders. Compartments soft/compressible. Pt denies fever/chills, n/v, numbness/tingling. ID to see pt this AM. Will continue to monitor. - Steven Shaikh PA-C Infectious Disease attending Dr. Ramirez evaluated pt at bedside and cleared for d/c home w/ rec of Doxy 100 mg BID x 7 days and outpt f/u in his office this week, requested HIV testing be sent (pt agreed), and Dr. Ramirez will f/u results of pending bloodwork this week. Pt feels well and is comfortable w/ discharge. Pt evaluated by ED attending Dr. Preciado who cleared for discharge home. Gave copy of and went over all results with patient at bedside. Discussed importance of PMD and ID f/u this week and advised on strict return precautions. Stable for d/c. - Steven Shaikh PA-C Infectious Disease attending Dr. Ramirez evaluated pt at bedside and cleared for d/c home w/ rec of Doxy 100 mg BID x 7 days and outpt f/u in his office this week, requested HIV testing be sent (pt agreed), and Dr. Ramirez will f/u results of pending bloodwork this week. Pt feels well and is comfortable w/ discharge. Pt evaluated by ED attending Dr. Preciado who cleared for discharge home. Gave copy of and went over all results with patient at bedside. Gave Vascular f/u. Discussed importance of PMD and ID f/u this week and advised on strict return precautions. Stable for d/c. - Steven Shaikh PA-C

## 2020-09-06 NOTE — ED CDU PROVIDER DISPOSITION NOTE - ATTENDING CONTRIBUTION TO CARE
DOMINGA: I , Dr Angelica Preciado have seen and evaluated the patient. Results of labs, tests, imaging have been reviewed. The patient reports improvement in symptoms. There is less erythema and swelling to the BL LE. No fevers, no leukocytosis. Pt seen by ID who rec DC home on PO doxy and will see in office this coming Wednesday. I have also d/w the pt that if condition persist alternate dx may be entertained such as vasculitis, and rec for close PCP FU as well.  The patient is stable for discharge home and will follow up with their primary physician.

## 2020-09-06 NOTE — ED CDU PROVIDER DISPOSITION NOTE - PROVIDER TOKENS
PROVIDER:[TOKEN:[[id number]],FOLLOWUP:[1-3 Days]] PROVIDER:[TOKEN:[[id number]],FOLLOWUP:[1-3 Days]],PROVIDER:[TOKEN:[[id number]],FOLLOWUP:[Routine]]
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
## 2020-09-06 NOTE — CONSULT NOTE ADULT - ASSESSMENT
ASSESSMENT:    RECOMMENDATIONS:    ANNE-MARIE Ramos, MD  Fellow, Infectious Diseases  Pager: [phone]  If no response, after 5pm and on Weekends: Call [phone] 30/M with PMH LLE cellulitis 8-10 years back  Reports h/o injury to LLE by metal bed frame 2 weeks PTA, f/b worsening LLE redness/swelling/pain. Also reports h/o ?exposure to poison oak  Took PO Bactrim (5days out of 7), f/b 5 days of PO Cefadroxil with no relief and then IV Clinda f/b PO Clindamycin  ======  LLE cellulitis  - clinically stable, afebrile, non-toxic appearing  - CRP 1.08, U/S LE negative for DVT  - D/w patient about HIV screening and confidentiality of testing. He was amenable to it.  - will recommend discharge on PO Doxycycline, with outpatient f/u  - of note - lung nodule (5mm) noted in RML - unclear significance, can f/u with his PCP     RECOMMENDATIONS:  1. LLE cellulitis  - continue IV Vancomycin while inpatient  - at time of discharge, can switch to Doxycycline 100mg PO q12h for 7 days  - pt counselled about side effects of Doxycycline (photosensitivity, risk of pill esophagitis on empty stomach). Also advised to avoid concurrent administration of multivitamins/milk/milk products  - to f/u in ID clinic this week with Dr Ramirez on Wednesday morning  - check HIV Ag/Ab screen  - f/u MRSA surveillance PCR  --------  2. RML lung nodule  - outpt f/u with PCP for lung nodule evaluation  Recs conveyed to CDU team ANNE-MARIE Guzmán, MD  Fellow, Infectious Diseases  Pager: [phone]  If no response, after 5pm and on Weekends: Call [phone]

## 2020-09-06 NOTE — ED CDU PROVIDER SUBSEQUENT DAY NOTE - GASTROINTESTINAL NEGATIVE STATEMENT, MLM
General Surgery Progress Note  CHAYA PALACIO    S: Patient transferred from SICU last night. Blood pressures and finger sticks stable. No acute events overnight. Patient has adequate pain control. Denies N/V and fevers/chills. Overall doing well.    O:  T(C): 36.4 (01-26-18 @ 05:24), Max: 36.9 (01-25-18 @ 15:00)  HR: 67 (01-26-18 @ 05:24) (65 - 87)  BP: 107/71 (01-26-18 @ 05:24) (82/55 - 107/71)  RR: 18 (01-26-18 @ 05:24) (8 - 26)  SpO2: 100% (01-26-18 @ 05:24) (94% - 100%)        Physical Exam:  Gen: NAD  Neuro: Follows commands  Resp: No acute respiratory distress, normal effort  CV: Normal rate, regular rhythm  Abd: Soft, NT, ND        A: 44F with metastatic ovarian cancer and gallbladder perforation s/p placement of a 10F drain in her gallbladder and yaa-cholecystic fluid with immediate drainage of 55cc. Hemodynamically stable. Transferred from SICU to Floor.    P:    - Drain care per IR, flush 5cc NS forward flush only daily  - Meropenem/Vanc empiric antibiotics  - PCA for pain control  - NPO  - Monitor drain output  - f/u drain culture General Surgery Progress Note  CHAYA PALACIO    S: Patient transferred from SICU last night. Blood pressures and finger sticks stable. No acute events overnight. Patient has adequate pain control. Denies N/V and fevers/chills. Overall doing well.    O:  T(C): 36.4 (01-26-18 @ 05:24), Max: 36.9 (01-25-18 @ 15:00)  HR: 67 (01-26-18 @ 05:24) (65 - 87)  BP: 107/71 (01-26-18 @ 05:24) (82/55 - 107/71)  RR: 18 (01-26-18 @ 05:24) (8 - 26)  SpO2: 100% (01-26-18 @ 05:24) (94% - 100%)        Physical Exam:  Gen: NAD, appears comfortable  Neuro: Follows commands  Resp: No acute respiratory distress, normal effort  CV: Normal rate, regular rhythm  Abd: Soft, diffuse tenderness maximally in right hemiabdomen, nondistended        A: 44F with metastatic ovarian cancer and gallbladder perforation s/p placement of a 10F drain in her gallbladder and yaa-cholecystic fluid with immediate drainage of 55cc. Hemodynamically stable. Transferred from SICU to Floor.    P:    - Drain care per IR, flush 5cc NS forward flush only daily  - Meropenem/Vanc empiric antibiotics  - PCA for pain control  - NPO  - Monitor drain output  - f/u drain culture no abdominal pain, no bloating, no constipation, no diarrhea, no nausea and no vomiting.

## 2020-09-06 NOTE — ED CDU PROVIDER DISPOSITION NOTE - NSFOLLOWUPINSTRUCTIONS_ED_ALL_ED_FT
1. Please follow up with your primary care doctor in the next 2-3 days for reevaluation   2. Additionally follow up with Infectious Disease Dr.??? within ??? days   3. Take antibiotics ???? as prescribed. Please complete full course   4. Return to ED for change of symptoms including increased or streaking redness, worsened swelling, pain, fevers, chills and any other concerns 1. Please follow up with your primary care doctor in the next 2-3 days for reevaluation   2. Additionally follow up with Infectious Disease Dr. Lenny Ramirez within 2-3 days. Please call [phone] to schedule an appointment at [address] [place] [address]  3. Take Doxycycline as prescribed. Please complete full course. Caution: This medication causes sensitivity to sunlight, please avoid direct exposure while taking the Doxycycline.  4. Continue to elevate the legs to reduce swelling.  5. Return to ED for change of symptoms including increased or streaking redness, worsened swelling, pain, fevers, chills and any other concerns.

## 2020-09-06 NOTE — ED CDU PROVIDER DISPOSITION NOTE - CARE PROVIDERS DIRECT ADDRESSES
[email or web address]

## 2020-09-06 NOTE — ED CDU PROVIDER SUBSEQUENT DAY NOTE - HISTORY
CDU NOTE AVELINO Hernandez: VSS NAD. Patient is resting comfortably and is without any complaints. On reexam pt v/l LE swelling improved with erythema receding from marked borders. Pending ID eval in am.

## 2020-09-06 NOTE — ED CDU PROVIDER DISPOSITION NOTE - PATIENT PORTAL LINK FT
You can access the FollowMyHealth Patient Portal offered by [facility] by registering at the following website: [email or web address]. By joining [facility]’s FollowMyHealth portal, you will also be able to view your health information using other applications (apps) compatible with our system.

## 2020-09-06 NOTE — ED ADULT NURSE REASSESSMENT NOTE - COMFORT CARE
darkened lights/po fluids offered/ambulated to bathroom/repositioned/plan of care explained/warm blanket provided

## 2020-09-08 ENCOUNTER — APPOINTMENT (OUTPATIENT)
Dept: INTERNAL MEDICINE | Facility: CLINIC | Age: 30
End: 2020-09-08

## 2020-09-09 ENCOUNTER — APPOINTMENT (OUTPATIENT)
Dept: INFECTIOUS DISEASE | Facility: CLINIC | Age: 30
End: 2020-09-09
Payer: MEDICAID

## 2020-09-09 ENCOUNTER — OUTPATIENT (OUTPATIENT)
Dept: OUTPATIENT SERVICES | Facility: HOSPITAL | Age: 30
LOS: 1 days | End: 2020-09-09
Payer: MEDICAID

## 2020-09-09 VITALS
TEMPERATURE: 98.9 F | OXYGEN SATURATION: 95 % | DIASTOLIC BLOOD PRESSURE: 93 MMHG | SYSTOLIC BLOOD PRESSURE: 149 MMHG | BODY MASS INDEX: 42.66 KG/M2 | HEART RATE: 92 BPM | WEIGHT: 315 LBS | HEIGHT: 72 IN

## 2020-09-09 DIAGNOSIS — S73.005A UNSPECIFIED DISLOCATION OF LEFT HIP, INITIAL ENCOUNTER: Chronic | ICD-10-CM

## 2020-09-09 DIAGNOSIS — B97.89 OTHER VIRAL AGENTS AS THE CAUSE OF DISEASES CLASSIFIED ELSEWHERE: ICD-10-CM

## 2020-09-09 PROCEDURE — 99214 OFFICE O/P EST MOD 30 MIN: CPT

## 2020-09-09 PROCEDURE — G0463: CPT

## 2020-09-15 NOTE — HISTORY OF PRESENT ILLNESS
[FreeTextEntry1] : Mr. Paez is a 30 year male with a history of LLE cellulitis about 8-10 years back now presenting to [facility] with LLE cellulitis. He injured his left leg hitting it against a bed frame. He took bactrim as outpatient, then changed to cefadroxil but with no improvement. He came to the ED on 8/30, improved on clindamycin, so discharged on clindamycin. He now returns with worsening erythema. He was started on IV doxycycline in the ED and received vancomycin as well. He has a normal WBC. and remains afebrile. He was discharged on doxycycline 100 mg PO BID to completed a week course. He also was found to have a lung nodule in the RML, and will need followup with his PMD. He has improvement in the erythema of the left leg. Still remains with swelling and remains tense. No fevers, no chills.

## 2020-09-15 NOTE — ASSESSMENT
[FreeTextEntry1] : Mr. Paez is a 30 year old male admitted to [facility] CDU on 9/5/20 for LLE cellulitis after taking a course of cefadroxil and clindamycin. He was started on IV doxycycline and vancomycin with improvement. He had a normal WBC and no fevers. He was discharged on oral doxycycline. He now returns to ID clinic for followup.\par \par He has improvement in the erythema of his LLE, remains with swelling.\par \par Recommend:\par #LLE cellulitis\par -Improving\par -Continue doxycycline to complete the 7 day course\par -Leg elevation \par -Check CBC/ CMP/ ESR/ CRP/ HIV today\par \par #RML Lung nodule\par -F/U with PMD\par \par RTC if symptoms do not improve.

## 2020-09-15 NOTE — PHYSICAL EXAM
[General Appearance - Well Nourished] : well nourished [General Appearance - Alert] : alert [General Appearance - In No Acute Distress] : in no acute distress [PERRL With Normal Accommodation] : pupils were equal in size, round, reactive to light [General Appearance - Well-Appearing] : healthy appearing [Sclera] : the sclera and conjunctiva were normal [Examination Of The Oral Cavity] : the lips and gums were normal [Extraocular Movements] : extraocular movements were intact [Hearing Threshold Finger Rub Not Blanco] : hearing was normal [Outer Ear] : the ears and nose were normal in appearance [Neck Appearance] : the appearance of the neck was normal [Neck Cervical Mass (___cm)] : no neck mass was observed [Oropharynx] : the oropharynx was normal with no thrush [Respiration, Rhythm And Depth] : normal respiratory rhythm and effort [Exaggerated Use Of Accessory Muscles For Inspiration] : no accessory muscle use [] : no respiratory distress [Heart Rate And Rhythm] : heart rate was normal and rhythm regular [Auscultation Breath Sounds / Voice Sounds] : lungs were clear to auscultation bilaterally [Heart Sounds] : normal S1 and S2 [Bowel Sounds] : normal bowel sounds [Murmurs] : no murmurs [Edema] : there was no peripheral edema [No Palpable Adenopathy] : no palpable adenopathy [Abdomen Soft] : soft [Abdomen Tenderness] : non-tender [Range of Motion to Joints] : range of motion to joints [Musculoskeletal - Swelling] : no joint swelling [Nail Clubbing] : no clubbing  or cyanosis of the fingernails [Motor Tone] : muscle strength and tone were normal [Motor Exam] : the motor exam was normal [Sensation] : the sensory exam was normal to light touch and pinprick [No Focal Deficits] : no focal deficits [Oriented To Time, Place, And Person] : oriented to person, place, and time [Affect] : the affect was normal [FreeTextEntry1] : left leg erythema improving, remains with swelling

## 2020-09-16 DIAGNOSIS — L03.116 CELLULITIS OF LEFT LOWER LIMB: ICD-10-CM

## 2020-09-16 DIAGNOSIS — R91.1 SOLITARY PULMONARY NODULE: ICD-10-CM

## 2020-09-25 RX ORDER — DOXYCYCLINE HYCLATE 100 MG/1
100 CAPSULE ORAL TWICE DAILY
Qty: 14 | Refills: 0 | Status: COMPLETED | COMMUNITY
Start: 2020-09-21 | End: 2020-09-25

## 2020-09-25 RX ORDER — DOXYCYCLINE HYCLATE 50 MG/1
50 CAPSULE ORAL
Qty: 90 | Refills: 3 | Status: COMPLETED | COMMUNITY
Start: 2018-03-20 | End: 2020-09-25

## 2020-09-28 ENCOUNTER — APPOINTMENT (OUTPATIENT)
Dept: INFECTIOUS DISEASE | Facility: CLINIC | Age: 30
End: 2020-09-28
Payer: MEDICAID

## 2020-09-28 VITALS
HEART RATE: 98 BPM | DIASTOLIC BLOOD PRESSURE: 89 MMHG | HEIGHT: 72 IN | TEMPERATURE: 98.2 F | BODY MASS INDEX: 42.66 KG/M2 | OXYGEN SATURATION: 98 % | WEIGHT: 315 LBS | RESPIRATION RATE: 16 BRPM | SYSTOLIC BLOOD PRESSURE: 152 MMHG

## 2020-09-28 DIAGNOSIS — R91.1 SOLITARY PULMONARY NODULE: ICD-10-CM

## 2020-09-28 DIAGNOSIS — R22.43 LOCALIZED SWELLING, MASS AND LUMP, LOWER LIMB, BILATERAL: ICD-10-CM

## 2020-09-28 PROCEDURE — 99214 OFFICE O/P EST MOD 30 MIN: CPT

## 2020-09-29 PROBLEM — R22.43 LOCALIZED SWELLING OF BOTH LOWER LEGS: Status: ACTIVE | Noted: 2020-09-29

## 2020-09-29 NOTE — PHYSICAL EXAM
[General Appearance - Alert] : alert [General Appearance - In No Acute Distress] : in no acute distress [General Appearance - Well Nourished] : well nourished [General Appearance - Well-Appearing] : healthy appearing [Sclera] : the sclera and conjunctiva were normal [PERRL With Normal Accommodation] : pupils were equal in size, round, reactive to light [Extraocular Movements] : extraocular movements were intact [Outer Ear] : the ears and nose were normal in appearance [Hearing Threshold Finger Rub Not Sampson] : hearing was normal [Examination Of The Oral Cavity] : the lips and gums were normal [Oropharynx] : the oropharynx was normal with no thrush [Neck Appearance] : the appearance of the neck was normal [Neck Cervical Mass (___cm)] : no neck mass was observed [] : no respiratory distress [Respiration, Rhythm And Depth] : normal respiratory rhythm and effort [Exaggerated Use Of Accessory Muscles For Inspiration] : no accessory muscle use [Auscultation Breath Sounds / Voice Sounds] : lungs were clear to auscultation bilaterally [Heart Rate And Rhythm] : heart rate was normal and rhythm regular [Heart Sounds] : normal S1 and S2 [Murmurs] : no murmurs [Edema] : there was no peripheral edema [Bowel Sounds] : normal bowel sounds [Abdomen Soft] : soft [Abdomen Tenderness] : non-tender [No Palpable Adenopathy] : no palpable adenopathy [Musculoskeletal - Swelling] : no joint swelling [Range of Motion to Joints] : range of motion to joints [Nail Clubbing] : no clubbing  or cyanosis of the fingernails [Motor Tone] : muscle strength and tone were normal [FreeTextEntry1] : bilateral LE erythema with papular rash on both legs, remains with b/l swelling [Sensation] : the sensory exam was normal to light touch and pinprick [Motor Exam] : the motor exam was normal [No Focal Deficits] : no focal deficits [Oriented To Time, Place, And Person] : oriented to person, place, and time [Affect] : the affect was normal

## 2020-09-29 NOTE — HISTORY OF PRESENT ILLNESS
[FreeTextEntry1] : Mr. Paez is a 30 year male with a history of LLE cellulitis about 8-10 years back now presenting to [facility] with LLE cellulitis. He injured his left leg hitting it against a bed frame. He took bactrim as outpatient, then changed to cefadroxil but with no improvement. He came to the ED on 8/30, improved on clindamycin, so discharged on clindamycin. He now returns with worsening erythema. He was started on IV doxycycline in the ED and received vancomycin as well. He has a normal WBC. and remains afebrile. He was discharged on doxycycline 100 mg PO BID to completed a week course. He also was found to have a lung nodule in the RML, and will need followup with his PMD. After discharge, he continued to improve on doxycycline. Was seen in ID clinic on 9/9/20 with improvement, but then started to worsen so prescirbed another course of doxycycline. Over the weekend, the erythema spread to the other leg and now has maculopapular lesions. Patient states possibly may have had contact with poison oak as he was working on a car and might have been exposed. Remains with bilateral LE swelling. No fevers, no chills. \par  \par

## 2020-09-29 NOTE — REVIEW OF SYSTEMS
[As Noted in HPI] : as noted in HPI [Negative] : Heme/Lymph [FreeTextEntry9] : B/L Leg swelling remains tense [de-identified] : Bilateral LE erythema with papular rash

## 2020-09-29 NOTE — ASSESSMENT
[FreeTextEntry1] : Mr. Paez is a 30 year old male admitted to [facility] CDU on 9/5/20 for LLE cellulitis after taking a course of cefadroxil and clindamycin. He was started on IV doxycycline and vancomycin with improvement. He had a normal WBC and no fevers. He was discharged on oral doxycycline. He was initially improving, but now has developed bilateral leg erythema and papular lesions. \par \par Has poison oak exposure - ?contact dermatitis vs stasis dermatitis\par \par Recommend:\par #Bilateral LE erythema \par -Remains with LE edema - ?stasis dermatitis ?contact derm\par -Would have Dermatology evaluate the patient\par -Check LE dopplers\par \par #RML Lung nodule\par -F/U with PMD\par \par

## 2020-10-23 ENCOUNTER — APPOINTMENT (OUTPATIENT)
Dept: INTERNAL MEDICINE | Facility: CLINIC | Age: 30
End: 2020-10-23

## 2020-10-31 ENCOUNTER — TRANSCRIPTION ENCOUNTER (OUTPATIENT)
Age: 30
End: 2020-10-31

## 2020-11-27 NOTE — ED ADULT NURSE NOTE - NSSISCREENINGQ2_ED_A_ED
PLAN DISCUSSED WITH HUMBLE FROM [facility]. PT IS ACCEPTED WHEN MEDICALLY CLEARED. No

## 2021-01-11 ENCOUNTER — APPOINTMENT (OUTPATIENT)
Dept: INTERNAL MEDICINE | Facility: CLINIC | Age: 31
End: 2021-01-11
Payer: MEDICAID

## 2021-01-11 PROCEDURE — 99442: CPT

## 2021-01-11 RX ORDER — DOXYCYCLINE HYCLATE 100 MG/1
100 CAPSULE ORAL TWICE DAILY
Qty: 14 | Refills: 0 | Status: DISCONTINUED | COMMUNITY
Start: 2020-09-25 | End: 2021-01-11

## 2021-04-27 NOTE — ED ADULT TRIAGE NOTE - RESPIRATORY RATE (BREATHS/MIN)
DR Haynes [place] PT   [employer] DUAL ICD CHECK IN OFFICE. NO ANA  I OFFERED IT TO HIM AND HE SAID HE WILL THINK ABOUT IT AND UNTIL THEN HE WILL CONTINUE TO COME TO THE OFFICE EVERY 3 MTHS FOR A DEVICE CHECK      BATTERY 5.3 YRS REMAINING  PRESENTS IN APVP  A PACED 99.9%  V PACED 43.2%   UNDERLYING ASVS  ATRIAL IMPEDENCE 513  RV IMPEDENCE 456  RV 45  SVC 59  P WAVES 2.5  RV WAVES 19.4  ATRIAL THRESHOLD 0.75 @ 0.4  ATRIAL AMPLITUDE ADAPTIVE  VENT THRESHOLD 1 @ 0.4  VENT AMPLITUDE ADAPTIVE   OPTIVOL IS ELEVATED AND PT IS SEEING [facility] IN THE [facility] TODAY AS WELL. 18

## 2021-08-01 ENCOUNTER — EMERGENCY (EMERGENCY)
Facility: HOSPITAL | Age: 31
LOS: 1 days | Discharge: HOME | End: 2021-08-01
Attending: STUDENT IN AN ORGANIZED HEALTH CARE EDUCATION/TRAINING PROGRAM
Payer: MEDICAID

## 2021-08-01 VITALS
RESPIRATION RATE: 16 BRPM | SYSTOLIC BLOOD PRESSURE: 153 MMHG | TEMPERATURE: 99 F | WEIGHT: 315 LBS | DIASTOLIC BLOOD PRESSURE: 81 MMHG | HEART RATE: 83 BPM | HEIGHT: 72 IN | OXYGEN SATURATION: 99 %

## 2021-08-01 VITALS
HEART RATE: 80 BPM | OXYGEN SATURATION: 100 % | DIASTOLIC BLOOD PRESSURE: 79 MMHG | RESPIRATION RATE: 17 BRPM | SYSTOLIC BLOOD PRESSURE: 198 MMHG

## 2021-08-01 DIAGNOSIS — S73.005A UNSPECIFIED DISLOCATION OF LEFT HIP, INITIAL ENCOUNTER: Chronic | ICD-10-CM

## 2021-08-01 PROCEDURE — 99282 EMERGENCY DEPT VISIT SF MDM: CPT

## 2021-08-01 PROCEDURE — 99283 EMERGENCY DEPT VISIT LOW MDM: CPT

## 2021-08-01 NOTE — ED PROVIDER NOTE - CLINICAL SUMMARY MEDICAL DECISION MAKING FREE TEXT BOX
Attending MD Laurent : 31 M p/w intermittent HA x weeks without red flag signs, normal neuro exam and currently asymptomatic. Doubt SAH given chronicity, and lack of thunderclap HA and lack of neuro deficit. Doubt intracranial lesions requiring emergent imaging given normal neuro exam and symptoms resolved. Doubt GCA d/t lack of vision sxs and no temple ttp. Will refer to neurology outpatient for poss MRI/ advanced HA therapy. In the interim, instructed patient to attempt basic therapies such as tylenol and ibuprofen with strict return precautions.

## 2021-08-01 NOTE — ED PROVIDER NOTE - PHYSICAL EXAMINATION
Attending MD Laurent :   PHYSICAL EXAM:    GENERAL: Obese  HEENT:  Atraumatic. TM normal bilaterally. Ears not red.   CHEST/LUNG: Chest rise equal bilaterally.   HEART: Regular rate and rhythm  ABDOMEN: Soft, Nontender, Nondistended  EXTREMITIES:  Extremities warm  PSYCH: A&Ox3  SKIN: No skin lesions over R side of head. No temple TTP.   NEUROLOGY: strength and sensation intact in all extremities. CN 2 - 12 intact. Finger to nose test intact. No pronator drift. Ambulatory without difficulty.

## 2021-08-01 NOTE — ED PROVIDER NOTE - PATIENT PORTAL LINK FT
You can access the FollowMyHealth Patient Portal offered by [facility] by registering at the following website: [email or web address]. By joining [facility]’s FollowMyHealth portal, you will also be able to view your health information using other applications (apps) compatible with our system.

## 2021-08-01 NOTE — ED PROVIDER NOTE - OBJECTIVE STATEMENT
Attending MD Laurent : 31 M hx HTN p/w intermittent HA x weeks. States pressure like sensation on R side of head lasting hours that self resolve. No other neuro sxs. No weakness, numbness, tingling. No vision changes. Today had the headache on the L side of the head and patient's mother instructed him to come in out of concern that he may have a sinus infection. No fevers, chills, facial pain. No runny nose. Patient at this time feels completely well and asymptomatic. Not taken any pain meds. No thunderclap HA, no hx of trauma. Ear redness only when he woke up in the morning, on the left side, no hearing changes and self resolved.

## 2021-08-01 NOTE — ED ADULT NURSE NOTE - OBJECTIVE STATEMENT
31 year old male a/ox3 ambulatory with steady gait presenting to ed with one week of right sided head pressure. denies any trauma/head injury. now feels pressure in his face. pt states he doesn't feel pressure in his ears however does feel them "clear" when he swallows". no nausea/vomiting/dizziness/vision changes. hx of htn,, on medication, has been taking it as ordered.

## 2021-08-01 NOTE — ED PROVIDER NOTE - NSFOLLOWUPCLINICS_GEN_ALL_ED_FT
[facility]  Neurology  [address] [place] [address] - [address] [place] [address]  Phone: [phone]  Fax:   Follow Up Time: 4-6 Days

## 2021-08-01 NOTE — ED PROVIDER NOTE - NSFOLLOWUPINSTRUCTIONS_ED_ALL_ED_FT
Please return to the ED for any concerns, including any worsening headache, nausea, vomiting, weakness or any numbness,     Your blood pressure is high. Please follow-up with your primary care doctor in the next 3 days to determine if you need to have a medication addition/change.     Please follow-up with a neurologist in the next week.     You may take tylenol 1000mg and ibuprofen 400mg every 6 hours as needed for pain.

## 2021-08-04 ENCOUNTER — APPOINTMENT (OUTPATIENT)
Dept: NEUROLOGY | Facility: CLINIC | Age: 31
End: 2021-08-04
Payer: MEDICAID

## 2021-08-04 VITALS
HEIGHT: 72 IN | HEART RATE: 73 BPM | DIASTOLIC BLOOD PRESSURE: 80 MMHG | WEIGHT: 315 LBS | OXYGEN SATURATION: 96 % | TEMPERATURE: 98 F | SYSTOLIC BLOOD PRESSURE: 152 MMHG | BODY MASS INDEX: 42.66 KG/M2

## 2021-08-04 DIAGNOSIS — R51.9 HEADACHE, UNSPECIFIED: ICD-10-CM

## 2021-08-04 PROBLEM — I10 ESSENTIAL (PRIMARY) HYPERTENSION: Chronic | Status: ACTIVE | Noted: 2021-08-01

## 2021-08-04 PROCEDURE — 99204 OFFICE O/P NEW MOD 45 MIN: CPT

## 2021-08-04 RX ORDER — TADALAFIL 20 MG/1
20 TABLET, FILM COATED ORAL
Qty: 6 | Refills: 5 | Status: DISCONTINUED | COMMUNITY
Start: 2018-03-14 | End: 2021-08-04

## 2021-08-04 NOTE — DATA REVIEWED
[de-identified] : CT head 4/2019:-\par "Comparison is made with the prior CT of 4/16/2019. \par \par Compared with the previous examination the right orbital floor fracture has \par been plated and is in more anatomic location. A mesh plate is present along \par the orbital floor extending to the medial orbit. There is no evidence of \par inferior muscle entrapment. The right inferior rectus muscle is minimally \par enlarged suggesting a small amount of hemorrhage or edema in the muscle and \par the adjacent fat. There is hemorrhage in the right maxillary sinus. The \par medial wall is intact. The lateral wall is intact. \par \par No other facial fractures are identified. The left orbit is normal. \par \par 3-D reconstructions are available. \par \par \par IMPRESSION: Plating of a previously depressed right orbital floor fracture \par since 4/16/2019, in good position. "

## 2021-08-04 NOTE — PHYSICAL EXAM
[FreeTextEntry1] : PHYSICAL EXAM\par Constitutional: Alert, no acute distress \par Neck: Full range of motion\par Psychiatric: appropriate affect and mood\par Pulmonary: No respiratory distress, stable on room air\par \par NEUROLOGICAL EXAM\par Mental status: The patient is alert, attentive, and oriented x 3.\par Speech/language: Clear and fluent with good repetition, comprehension, and naming\par Cranial nerves:\par CN II: Visual fields are full to confrontation. Pupil size equal and briskly reactive to light. \par CN III, IV, VI: EOMI, no nystagmus, no ptosis\par CN V: Facial sensation is intact to pinprick in all 3 divisions bilaterally.\par CN VII: Face is symmetric with normal eye closure and smile.\par CN VII: Hearing is normal to rubbing fingers\par CN IX, X: Palate elevates symmetrically. Phonation is normal.\par CN XI: Head turning and shoulder shrug are intact\par CN XII: Tongue is midline with normal movements and no atrophy.\par Motor: There is no pronator drift of out-stretched arms. Muscle bulk and tone are normal. Strength is full bilaterally. 5/5 muscle power at bilat: Deltoid, Biceps, Triceps, Wrist ext, Finger abd, Hip flex, Hip ext, Knee flex, Knee ext, Ankle flex, Ankle ext\par Reflexes: Reflexes are 2+ and symmetric at the biceps and knees. \par Sensory: Intact sensations to light touch in upper and lower extremities\par Coordination: Rapid alternating movements and fine finger movements are intact. There is no dysmetria on finger-to-nose. There are no abnormal or extraneous movements. \par Gait/Stance: Posture is normal. Gait is steady with normal steps, base, arm swing, and turning. \par \par \par \par \par

## 2021-08-04 NOTE — ASSESSMENT
[FreeTextEntry1] : Assessment/Plan:\par  31 year old male with a hx of  Anxiety, HTN, right orbital floor fracture s/p plating 2019, Chronic lower back pain (on oxycodone), JANESSA (non using Cpap) referred to neurology for new onset right temporal headache. \par \par Right temporal headaches x 2 weeks associated with facial flushing and sweating- possible cluster headaches. Will also plan to obtain CT head to rule out any intracranial pathology and also order CT maxillofacial to rule out sinus or orbital pathology (given hx of right orbital fracture s/p plating)\par \par Plan:-\par CT head and CT maxillofacial w/w/o contrast\par Start sumatriptan 50 mg tablet, take 1 tablet at the onset of the headache. Can repeat dose in 2 hours if needed. Limit dose to 2 tab/day and 5 tab/week. Discussed the side effects drowsiness and nausea/diarrhea.\par ]\par I have recommended referral to sleep medicine for evaluation of sleep disorder; known hx of  obstructive sleep apnea. Discussed the importance of sleep apnea evaluation and treatment. If left untreated, obstructive sleep apnea can increase the risk of health problems, including: High blood pressure, Stroke, Heart failure, and heart attacks. Untreated sleep apnea can also exacerbate headaches.\par \par Headache education provided:\par [] Stay well hydrated\par [] Limit excessive caffeine and alcohol intake\par [] Maintain good sleep hygiene\par [] Try to avoid triggers; Lifestyle modifications\par [] Practice good eating habits\par [] Avoid excessive use of over the counter pain medications, as they can cause medication overuse headaches \par [] Keep a headache diary\par [] Relaxation techniques, biofeedback, massage therapy, acupunctures, and heating pads may be effective\par \par Return to clinic 2 weeks. \par \par Available imaging studies and/or blood work up were reviewed. A detailed chart review was completed prior to visit.\par \par The above plan was discussed with NATALIE AKERS in great detail.  NATALIE AKERS verbalized understanding and agrees with plan as detailed above. Patient was provided education and counselling on current diagnosis/symptoms, diagnostic work up, treatment options and potential side effects of any prescribed therapy/therapies. He was advised to call our clinic at [phone] for any new or worsening symptoms, or with any questions or concerns. In case of acute onset of neurological symptoms or worsening presentation, patient was advised to present to nearest emergency room for further evaluation. NATALIE AKERS expressed understanding and all his questions/concerns were addressed.\par \par Annelise Oro M.D\par

## 2021-08-04 NOTE — HISTORY OF PRESENT ILLNESS
[FreeTextEntry1] : HPI (initial visit Aug 04, 2021)- 30 yo old man with a hx of  Anxiety, HTN, right orbital floor fracture s/p plating 2019, Chronic lower back pain (on oxycodone), JANESSA (non using Cpap) referred to neurology for right sided head pain.\par \par He was seen at [facility] ED on 8/1/2021. \par \par He report right sided head pain x 2 weeks. Temporal pain. It feels sore to touch. IT sometimes get warm and red. "It is pressure". It is constant. "My right ear gets hots". Occasionally gets the symptoms on the left. No ear pain. NO fevers. No change in vision. No nausea or vomiting. No light sensitivity. When at worse it is a 6/10. The pain manageable. "It is annoying". No tearing of eyes, redness of eyes, or eyelid drooping. No speech changes, no vertigo, no numbness/weakness of face or extremities. + Slight lightheadedness. Not congested. No running nose. \par \par He has a hx of migraines in the past -he could get nauseous, vomit and have photophobia. He has not had a migraine for years. "This is not a migraine".  "I would just want to sleep".

## 2021-08-05 RX ORDER — SUMATRIPTAN 50 MG/1
50 TABLET, FILM COATED ORAL
Qty: 9 | Refills: 6 | Status: ACTIVE | COMMUNITY
Start: 2021-08-04 | End: 1900-01-01

## 2021-08-18 ENCOUNTER — APPOINTMENT (OUTPATIENT)
Dept: NEUROLOGY | Facility: CLINIC | Age: 31
End: 2021-08-18

## 2021-08-26 ENCOUNTER — APPOINTMENT (OUTPATIENT)
Dept: NEUROLOGY | Facility: CLINIC | Age: 31
End: 2021-08-26

## 2022-01-25 ENCOUNTER — APPOINTMENT (OUTPATIENT)
Age: 32
End: 2022-01-25

## 2022-02-09 ENCOUNTER — NON-APPOINTMENT (OUTPATIENT)
Age: 32
End: 2022-02-09

## 2022-02-10 ENCOUNTER — APPOINTMENT (OUTPATIENT)
Dept: INTERNAL MEDICINE | Facility: CLINIC | Age: 32
End: 2022-02-10

## 2022-02-20 ENCOUNTER — TRANSCRIPTION ENCOUNTER (OUTPATIENT)
Age: 32
End: 2022-02-20

## 2022-02-24 DIAGNOSIS — L03.116 CELLULITIS OF LEFT LOWER LIMB: ICD-10-CM

## 2022-02-24 DIAGNOSIS — L23.7 ALLERGIC CONTACT DERMATITIS DUE TO PLANTS, EXCEPT FOOD: ICD-10-CM

## 2022-02-24 DIAGNOSIS — M54.50 LOW BACK PAIN, UNSPECIFIED: ICD-10-CM

## 2022-02-28 ENCOUNTER — APPOINTMENT (OUTPATIENT)
Dept: INTERNAL MEDICINE | Facility: CLINIC | Age: 32
End: 2022-02-28
Payer: MEDICAID

## 2022-02-28 VITALS — SYSTOLIC BLOOD PRESSURE: 124 MMHG | DIASTOLIC BLOOD PRESSURE: 68 MMHG | RESPIRATION RATE: 14 BRPM | HEART RATE: 74 BPM

## 2022-02-28 DIAGNOSIS — G47.33 OBSTRUCTIVE SLEEP APNEA (ADULT) (PEDIATRIC): ICD-10-CM

## 2022-02-28 PROCEDURE — 99213 OFFICE O/P EST LOW 20 MIN: CPT

## 2022-02-28 NOTE — HISTORY OF PRESENT ILLNESS
[de-identified] : Mr. AKERS RTC to reassess use of chronic opioid use. \par \par Source of pain related to lowere back\par \par Able to maintain occupation, ADL and social functioning use use of pain medications. \par \par Denies constipation, nausea, urinary hesitancy, pruritus, excessive somnolence.\par \par Have been no requests for early renewals or evidence of inappropriate use.\par

## 2022-02-28 NOTE — ASSESSMENT
[FreeTextEntry1] : Review of use of pain medications seems appropriate. Mr. AKERS is able to maintain social and occupational functionality. He denies such side effects as excessive somnolence, constipation and pruritus.  There is no evidence or concern about aberrant behavior, including diverting or use for non-medical reasons.\par \par Review pain use agreement and check comprehensive urine screen. \par \par 24 minutes spent in preparation of this visit, including review of previous notes and test results, interview and examination of patient, discussion of plan, arranging for appropriate testing and treatment, and documentation.\par

## 2022-03-04 DIAGNOSIS — I10 ESSENTIAL (PRIMARY) HYPERTENSION: ICD-10-CM

## 2022-04-07 NOTE — ED PROVIDER NOTE - NS ED MD DISPO SPECIAL CONSIDERATION1
Subjective:     Patient ID: Deann Jack is a 20 y o  male  [facility] Clinical Progress Notes      Specialized Services Documentation  Therapist must complete separate progress note for each specific clinical activity in which the individual participated during the day  Allied Therapy   [id number]  actively shared in [facility] Partha LOPEZ None

## 2022-06-26 PROBLEM — Z87.898 HISTORY OF CHEST PAIN: Status: RESOLVED | Noted: 2022-02-28 | Resolved: 2022-06-26

## 2022-06-26 PROBLEM — Z87.898 HISTORY OF HEADACHE: Status: RESOLVED | Noted: 2021-08-02 | Resolved: 2022-06-26

## 2022-06-27 ENCOUNTER — APPOINTMENT (OUTPATIENT)
Dept: INTERNAL MEDICINE | Facility: CLINIC | Age: 32
End: 2022-06-27

## 2022-06-27 DIAGNOSIS — Z87.898 PERSONAL HISTORY OF OTHER SPECIFIED CONDITIONS: ICD-10-CM

## 2022-06-27 DIAGNOSIS — Z79.899 OTHER LONG TERM (CURRENT) DRUG THERAPY: ICD-10-CM

## 2022-12-31 NOTE — ED ADULT NURSE NOTE - NS PRO PASSIVE SMOKE EXP
Pt ready to go.  Dc'd home in stable condition with copy of instructions accompanied by .    Unknown

## 2023-01-28 ENCOUNTER — EMERGENCY (EMERGENCY)
Facility: HOSPITAL | Age: 33
LOS: 1 days | Discharge: HOME | End: 2023-01-28
Attending: STUDENT IN AN ORGANIZED HEALTH CARE EDUCATION/TRAINING PROGRAM | Admitting: STUDENT IN AN ORGANIZED HEALTH CARE EDUCATION/TRAINING PROGRAM
Payer: MEDICAID

## 2023-01-28 VITALS
SYSTOLIC BLOOD PRESSURE: 148 MMHG | HEART RATE: 80 BPM | DIASTOLIC BLOOD PRESSURE: 61 MMHG | OXYGEN SATURATION: 100 % | RESPIRATION RATE: 16 BRPM

## 2023-01-28 VITALS
SYSTOLIC BLOOD PRESSURE: 162 MMHG | OXYGEN SATURATION: 100 % | RESPIRATION RATE: 16 BRPM | DIASTOLIC BLOOD PRESSURE: 89 MMHG | HEART RATE: 84 BPM | TEMPERATURE: 98 F

## 2023-01-28 DIAGNOSIS — S73.005A UNSPECIFIED DISLOCATION OF LEFT HIP, INITIAL ENCOUNTER: Chronic | ICD-10-CM

## 2023-01-28 PROCEDURE — 99284 EMERGENCY DEPT VISIT MOD MDM: CPT
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
## 2023-01-28 NOTE — ED PROVIDER NOTE - ATTENDING CONTRIBUTION TO CARE
I have personally performed a face to face medical and diagnostic evaluation of the patient. I have discussed with and reviewed the Resident's note and agree with the History, ROS, Physical Exam and MDM unless otherwise indicated. A brief summary of my personal evaluation and impression can be found below.    32-year-old male with past medical history of presenting with chief complaint of epistaxis right naris.  patient states that he has had a small amount of bleeding for the past few days, states that his apartment is very dry.  any use of anticoagulation.  No other complaints at this time.  On exam, vital signs stable, no you can leave it here I have personally performed a face to face medical and diagnostic evaluation of the patient. I have discussed with and reviewed the Resident's note and agree with the History, ROS, Physical Exam and MDM unless otherwise indicated. A brief summary of my personal evaluation and impression can be found below.    32-year-old male with past medical history of presenting with chief complaint of epistaxis right naris.  patient states that he has had a small amount of bleeding for the past few days, states that his apartment is very dry.   denies any use of anticoagulation.  no trauma. No other complaints at this time.  On exam, vital signs stable, With no active bleeding on examination.  Likely dry mucous membranes causing will observe in the ER for rpt bleed, patient would benefit from ENT follow-up outpatient. reassess to dispo. Jeanine Sun, ED Attending Normal rate, regular rhythm.  Heart sounds S1, S2.  No murmurs, rubs or gallops.

## 2023-01-28 NOTE — ED ADULT TRIAGE NOTE - CHIEF COMPLAINT QUOTE
pt has htn an d is having a nose bleed, started 15 min ago pt thinks its actively bleeding pt has htn an d is having a nose bleed, started 15 min ago pt thinks its actively bleeding  no blood thinners pt has htn an d is having a nose bleed, started 15 min ago pt thinks its actively bleeding  no blood thinners.     add: pt c/o of feeling light headed and dizzy  vs rechecked and pt brought to amb bay to lye down

## 2023-01-28 NOTE — ED PROVIDER NOTE - PHYSICAL EXAMINATION
CONSTITUTIONAL: Well-developed; well-nourished; in no acute distress.   SKIN: warm, dry  HEAD: Normocephalic; atraumatic.  EYES: no conjunctival injection. PERRL.   ENT: R nostril with minimal dried blood, mucosal disruption to medial aspect without active bleeding. L nostril wnl. Oropharynx clear.  NECK: Supple; non tender.  CARD: S1, S2 normal; no murmurs, gallops, or rubs. Regular rate and rhythm.   RESP: No wheezes, rales or rhonchi. Good air movement bilaterally.   ABD: soft ntnd, no guarding, no distention, no rigidity.   EXT: Ambulates independently.  No cyanosis or edema.   NEURO: Alert, oriented, grossly unremarkable  PSYCH: Cooperative, appropriate.

## 2023-01-28 NOTE — ED ADULT NURSE NOTE - CHIEF COMPLAINT QUOTE
pt has htn an d is having a nose bleed, started 15 min ago pt thinks its actively bleeding  no blood thinners.     add: pt c/o of feeling light headed and dizzy  vs rechecked and pt brought to amb bay to lye down
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
## 2023-01-28 NOTE — ED PROVIDER NOTE - CLINICAL SUMMARY MEDICAL DECISION MAKING FREE TEXT BOX
Patient is a 32-year-old male past medical history of hypertension coming in for nosebleed.  Currently vital signs stable.  Complaining of right nostril epistaxis for 45 minutes, atraumatic, currently without active bleeding visualized on exam, with clear oropharynx and no sensation of blood in the throat currently.  Not on AC, likely due to friable mucosa with dry air.  Clear to right nostril with clots, with no ongoing bleeding, applied Afrin, and instructed patient to apply pressure.  Discussed usage of humidifiers, pressure holding techniques for epistaxis.  Will reassess for control of bleeding, likely discharge.

## 2023-01-28 NOTE — ED PROVIDER NOTE - OBJECTIVE STATEMENT
Patient is a 32-year-old male past medical history of hypertension coming in for nosebleed.  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  Patient is gotten nosebleeds in the past, notes that when it is dry it like it is today he is more likely to get nosebleeds, usually have not lasted this long.  After nosebleed started patient held pressure with a washcloth to his nose.  Patient states he does not have any blood currently taste in his mouth, has not noticed any blood dripping from his washcloth for least a few minutes.  Was previously mildly lightheaded, no longer lightheaded, no other complaints currently including dizziness, vision changes, nausea, vomiting, facial pain, chest pain, difficulty breathing, throat tightness, bleeding elsewhere.

## 2023-01-28 NOTE — ED PROVIDER NOTE - PATIENT PORTAL LINK FT
You can access the FollowMyHealth Patient Portal offered by [facility] by registering at the following website: [email or web address]. By joining [facility] [employer] [facility]’s FollowMyHealth portal, you will also be able to view your health information using other applications (apps) compatible with our system.

## 2023-01-28 NOTE — ED PROVIDER NOTE - NSFOLLOWUPINSTRUCTIONS_ED_ALL_ED_FT
You were seen in the ED for nosebleed    Your examination in the ED showed no findings requiring further evaluation or treatment in the hospital at this time.    Please follow up with your PCP as discussed within 1 week. Continue to take all medications prescribed to you as instructed    Seek immediate medical attention if you experience New nosebleeds that will not stop, difficulty breathing, new pain or worsening symptoms        Nosebleed, Adult      A nosebleed is when blood comes out of the nose. Nosebleeds are common. Usually, they are not a sign of a serious condition.    Nosebleeds can happen if a blood vessel in your nose starts to bleed or if the lining of your nose (mucous membrane) cracks. They are commonly caused by:  •Allergies.      •Colds.      •Picking your nose.      •Blowing your nose too hard.      •An injury from sticking an object into your nose or getting hit in the nose.      •Dry or cold air.      Less common causes of nosebleeds include:  •Toxic fumes.      •Something abnormal in the nose or in the air-filled spaces in the bones of the face (sinuses).      •Growths in the nose, such as polyps.      •Blood thinners or conditions that cause blood to clot slowly.      •Certain illnesses or procedures that irritate or dry out the nasal passages.        Follow these instructions at home:      When you have a nosebleed:      •Sit down and tilt your head slightly forward.      •Use a clean towel or tissue to pinch your nostrils under the bony part of your nose. After 5 minutes, let go of your nose and see if bleeding starts again. Do not release pressure before that time. If there is still bleeding, repeat the pinching and holding for 5 minutes or until the bleeding stops.      • Do not place tissues or gauze in the nose to stop the bleeding.      •Avoid lying down and avoid tilting your head backward. That may make blood collect in the throat and cause gagging or coughing.      •Use a nasal spray decongestant to help with a nosebleed as told by your health care provider.      After a nosebleed:     •Avoid blowing your nose or sniffing for a number of hours.      •Avoid straining, lifting, or bending at the waist for several days. You may go back to other normal activities as you are able.    •If you are taking aspirin or blood thinners and you have nosebleeds, talk to your health care provider. These medicines make bleeding more likely.  •Ask your health care provider if you should stop taking the medicines or if you should adjust the dose.      •Do not stop taking medicines that your health care provider has recommended unless he or she tells you to stop taking them.      •If your nosebleed was caused by dry mucous membranes, use over-the-counter saline nasal spray or gel and a humidifier as told by your health care provider. This will keep the mucous membranes moist and allow them to heal. If you need to use one of these products:  •Choose one that is water-soluble.      •Use only as much as you need and use it only as often as needed.      •Do not lie down right after you use it.      •If you get nosebleeds often, talk with your health care provider about medical treatments. Options may include:  •Nasal cautery. This treatment stops and prevents nosebleeds by using a chemical swab or electrical device to lightly burn tiny blood vessels inside the nose.      •Nasal packing. A gauze or other material is placed in the nose to keep constant pressure on the bleeding area.          Contact a health care provider if you:    •Have a fever.      •Get nosebleeds often or more often than usual.      •Bruise very easily.      •Have a nosebleed from having something stuck in your nose.      •Have bleeding in your mouth.      •Vomit or cough up brown material.      •Have a nosebleed after you start a new medicine.        Get help right away if:    •You have a nosebleed after a fall or a head injury.      •Your nosebleed does not go away after 20 minutes.      •You feel dizzy or weak.      •You have unusual bleeding from other parts of your body.      •You have unusual bruising on other parts of your body.      •You become sweaty.      •You vomit blood.        Summary    •A nosebleed is when blood comes out of the nose. Common causes include allergies, an injury to the nose, or cold or dry air.      •Initial treatment includes applying pressure for 5 minutes.       •Moisturizing the nose with saline nasal spray or gel after a nosebleed may help prevent future bleeding.      •Get help right away if your nosebleed does not go away after 20 minutes.

## 2023-01-28 NOTE — ED ADULT NURSE NOTE - OBJECTIVE STATEMENT
Patient is a 33 yo male, h/x HTN, presenting with nosebleed since 15 minutes prior to arrival. AAOx4, no signs of distress, reports large amount of bleeding with clots, was experiencing dizziness in triage but currently denies. Denies chest pain, shortness of breath. Bleeding resolved on exam. Pending dispo. Fall precautions maintained.

## 2023-02-23 ENCOUNTER — RX RENEWAL (OUTPATIENT)
Age: 33
End: 2023-02-23

## 2023-02-23 RX ORDER — LOSARTAN POTASSIUM AND HYDROCHLOROTHIAZIDE 25; 100 MG/1; MG/1
100-25 TABLET ORAL
Qty: 90 | Refills: 3 | Status: ACTIVE | COMMUNITY
Start: 2023-02-23 | End: 1900-01-01

## 2023-03-20 NOTE — ED PROVIDER NOTE - CHIEF COMPLAINT
The patient is a 31y Male complaining of 
[address] [address] 03/24/2020 10:39 AM    LDLCALC 98 04/05/2019 07:14 AM    GLUCOSE 94 01/26/2022 08:50 AM    LABA1C 5.4 01/26/2022 08:50 AM    LABA1C 5.1 03/24/2020 10:39 AM       The ASCVD Risk score (Ponce CARPIO, et al., 2019) failed to calculate for the following reasons: The 2019 ASCVD risk score is only valid for ages 36 to 78    Immunization History   Administered Date(s) Administered    COVID-19, PFIZER PURPLE top, DILUTE for use, (age 15 y+), 30mcg/0.3mL 12/23/2020, 01/15/2021, 03/16/2021    Hepatitis B 05/23/2006    Influenza Vaccine, unspecified formulation 10/05/2016    Influenza Virus Vaccine 10/23/2013, 10/16/2017, 10/17/2019, 10/22/2020       Health Maintenance   Topic Date Due    Varicella vaccine (1 of 2 - 2-dose childhood series) Never done    COVID-19 Vaccine (4 - Booster for Pfizer series) 05/11/2021    Flu vaccine (1) 08/01/2022    Lipids  01/26/2023    Depression Screen  01/26/2023    Cervical cancer screen  01/27/2026    DTaP/Tdap/Td vaccine (3 - Td or Tdap) 08/11/2032    Hepatitis C screen  Completed    HIV screen  Completed    Hepatitis A vaccine  Aged Out    Hib vaccine  Aged Out    Meningococcal (ACWY) vaccine  Aged Out    Pneumococcal 0-64 years Vaccine  Aged Out       Assessment & Plan   Routine physical examination  -     Iron and TIBC; Future  -     Ferritin; Future  NAFLD (nonalcoholic fatty liver disease)  Postoperative hypothyroidism  -     levothyroxine (SYNTHROID) 125 MCG tablet; TAKE TWO TABLETS BY MOUTH ONE TIME DAILY, Disp-180 tablet, R-3Normal  Gastroesophageal reflux disease, unspecified whether esophagitis present  Encounter for well adult exam without abnormal findings  Microcytic red blood cells  -     Iron and TIBC; Future  -     Ferritin;  Future  Class 3 severe obesity due to excess calories without serious comorbidity with body mass index (BMI) of 40.0 to 44.9 in adult [facility])  Ozempic sample given to patient (LOT #[id number] exp 4/25)  If able to get [facility] patient is going to

## 2023-06-01 ENCOUNTER — APPOINTMENT (OUTPATIENT)
Dept: INTERNAL MEDICINE | Facility: CLINIC | Age: 33
End: 2023-06-01

## 2023-06-29 ENCOUNTER — APPOINTMENT (OUTPATIENT)
Dept: INTERNAL MEDICINE | Facility: CLINIC | Age: 33
End: 2023-06-29
Payer: MEDICAID

## 2023-06-29 DIAGNOSIS — G89.29 OTHER CHRONIC PAIN: ICD-10-CM

## 2023-06-29 DIAGNOSIS — Z00.00 ENCOUNTER FOR GENERAL ADULT MEDICAL EXAMINATION W/OUT ABNORMAL FINDINGS: ICD-10-CM

## 2023-06-29 PROCEDURE — 99214 OFFICE O/P EST MOD 30 MIN: CPT

## 2023-06-29 RX ORDER — CITALOPRAM HYDROBROMIDE 20 MG/1
20 TABLET, FILM COATED ORAL
Qty: 90 | Refills: 3 | Status: ACTIVE | COMMUNITY
Start: 2023-06-29 | End: 1900-01-01

## 2023-06-30 VITALS — SYSTOLIC BLOOD PRESSURE: 118 MMHG | RESPIRATION RATE: 14 BRPM | DIASTOLIC BLOOD PRESSURE: 78 MMHG | HEART RATE: 70 BPM

## 2023-06-30 NOTE — ASSESSMENT
[FreeTextEntry1] : Review of use of pain medications seems appropriate. Mr. AKERS is able to maintain social and occupational functionality. He denies such side effects as excessive somnolence, constipation and pruritus.  There is no evidence or concern about aberrant behavior, including diverting or use for non-medical reasons.\par \par Check comprehensive urine screen. \par Review pain agreement.\par Referred to pain management. \par \par Trial of Celexa for anxiety\par \par 34 minutes spent in preparation of this visit, including review of previous notes and test results, interview and examination of patient, discussion of plan, arranging for appropriate testing and treatment, and documentation.\par

## 2023-06-30 NOTE — HEALTH RISK ASSESSMENT
[Audit-CScore] : 2 [[id number]] : 0 [Change in mental status noted] : No change in mental status noted [Language] : denies difficulty with language [Behavior] : denies difficulty with behavior [Learning/Retaining New Information] : denies difficulty learning/retaining new information [Handling Complex Tasks] : denies difficulty handling complex tasks [Reasoning] : denies difficulty with reasoning [Spatial Ability and Orientation] : denies difficulty with spatial ability and orientation [Reports changes in hearing] : Reports no changes in hearing [Reports changes in vision] : Reports no changes in vision [Reports changes in dental health] : Reports no changes in dental health

## 2023-06-30 NOTE — HISTORY OF PRESENT ILLNESS
[FreeTextEntry1] : Mr. AKERS is here for an annual physical examination and assessment.\par  [de-identified] : He generally feels well with no specific complaints. His recent health has been good.\kassandra Continues to use oxycodone for pain related to back. Needs it to e able to work; works in [employer], a Physcial demanding job.\par Recognizes risk of addiction and withdrawal, and is amendable to other modalities to reduce or remove use of opioids. \par Uses Xanax rarely as finds it makes too sleepy. \par \par Denies headache, dizziness.\par Denies rash, fatigue, fever, weight loss, anorexia.\par Denies cough, wheezing.\par Denies CP, SOB, BOOTH, edema, palpitations.\par Denies abdominal pain, N/V/D/C. Denies BRBPR, melena, dysphagia.\par Denies dysuria, frequency, hematuria, hesitancy.\par Denies weakness, numbness, gait instability.

## 2023-07-21 NOTE — HISTORY OF PRESENT ILLNESS
[FreeTextEntry1] : Mr. AKERS is here for an annual physical examination and assessment.\par  [de-identified] : He generally feels well with no specific complaints. His recent health has been good.\par \par Denies headache, dizziness.\par Denies rash, fatigue, fever, weight loss, anorexia.\par Denies cough, wheezing.\par Denies CP, SOB, BOOTH, edema, palpitations.\par Denies abdominal pain, N/V/D/C. Denies BRBPR, melena, dysphagia.\par Denies dysuria, frequency, hematuria, hesitancy.\par Denies weakness, numbness, gait instability.\par

## 2023-07-27 ENCOUNTER — APPOINTMENT (OUTPATIENT)
Dept: INTERNAL MEDICINE | Facility: CLINIC | Age: 33
End: 2023-07-27

## 2023-08-14 ENCOUNTER — NON-APPOINTMENT (OUTPATIENT)
Age: 33
End: 2023-08-14

## 2023-08-17 DIAGNOSIS — J01.40 ACUTE PANSINUSITIS, UNSPECIFIED: ICD-10-CM

## 2023-09-06 RX ORDER — AMOXICILLIN AND CLAVULANATE POTASSIUM 875; 125 MG/1; MG/1
875-125 TABLET, COATED ORAL
Qty: 14 | Refills: 0 | Status: DISCONTINUED | COMMUNITY
Start: 2023-08-17 | End: 2023-09-06

## 2023-09-06 RX ORDER — NALOXONE HYDROCHLORIDE 4 MG/.1ML
4 SPRAY NASAL
Qty: 1 | Refills: 0 | Status: ACTIVE | COMMUNITY
Start: 2022-07-15 | End: 1900-01-01

## 2023-11-10 NOTE — ED ADULT NURSE NOTE - NSFALLRSKOUTCOME_ED_ALL_ED
Universal Safety Interventions Opzelura Counseling:  I discussed with the patient the risks of Opzelura including but not limited to nasopharngitis, bronchitis, ear infection, eosinophila, hives, diarrhea, folliculitis, tonsillitis, and rhinorrhea.  Taken orally, this medication has been linked to serious infections; higher rate of mortality; malignancy and lymphoproliferative disorders; major adverse cardiovascular events; thrombosis; thrombocytopenia, anemia, and neutropenia; and lipid elevations.

## 2024-02-03 ENCOUNTER — NON-APPOINTMENT (OUTPATIENT)
Age: 34
End: 2024-02-03

## 2024-06-26 NOTE — ED ADULT NURSE NOTE - NSIMPLEMENTINTERV_GEN_ALL_ED
Explained side effects of medications including dependence and sedation with Lorazepam .  Advised NOT to drive or operate machinery after taking Lorazepam.    
Implemented All Universal Safety Interventions:  [place] to call system. Call bell, personal items and telephone within reach. Instruct patient to call for assistance. Room bathroom lighting operational. Non-slip footwear when patient is off stretcher. Physically safe environment: no spills, clutter or unnecessary equipment. Stretcher in lowest position, wheels locked, appropriate side rails in place.

## 2025-01-03 ENCOUNTER — NON-APPOINTMENT (OUTPATIENT)
Age: 35
End: 2025-01-03

## 2025-01-03 ENCOUNTER — APPOINTMENT (OUTPATIENT)
Dept: INTERNAL MEDICINE | Facility: CLINIC | Age: 35
End: 2025-01-03
Payer: MEDICAID

## 2025-01-03 ENCOUNTER — OUTPATIENT (OUTPATIENT)
Dept: OUTPATIENT SERVICES | Facility: HOSPITAL | Age: 35
LOS: 1 days | End: 2025-01-03
Payer: MEDICAID

## 2025-01-03 VITALS
OXYGEN SATURATION: 96 % | WEIGHT: 315 LBS | SYSTOLIC BLOOD PRESSURE: 122 MMHG | HEIGHT: 72 IN | HEART RATE: 78 BPM | BODY MASS INDEX: 42.66 KG/M2 | DIASTOLIC BLOOD PRESSURE: 90 MMHG

## 2025-01-03 DIAGNOSIS — S73.005A UNSPECIFIED DISLOCATION OF LEFT HIP, INITIAL ENCOUNTER: Chronic | ICD-10-CM

## 2025-01-03 DIAGNOSIS — F41.9 ANXIETY DISORDER, UNSPECIFIED: ICD-10-CM

## 2025-01-03 DIAGNOSIS — G47.33 OBSTRUCTIVE SLEEP APNEA (ADULT) (PEDIATRIC): ICD-10-CM

## 2025-01-03 DIAGNOSIS — E66.01 MORBID (SEVERE) OBESITY DUE TO EXCESS CALORIES: ICD-10-CM

## 2025-01-03 DIAGNOSIS — I10 ESSENTIAL (PRIMARY) HYPERTENSION: ICD-10-CM

## 2025-01-03 DIAGNOSIS — F55.3 ABUSE OF STEROIDS OR HORMONES: ICD-10-CM

## 2025-01-03 PROCEDURE — 99214 OFFICE O/P EST MOD 30 MIN: CPT

## 2025-01-03 PROCEDURE — G2211 COMPLEX E/M VISIT ADD ON: CPT

## 2025-01-03 PROCEDURE — G0463: CPT

## 2025-01-05 LAB
CREAT SPEC-SCNC: 83 MG/DL
MICROALBUMIN 24H UR DL<=1MG/L-MCNC: <1.2 MG/DL
MICROALBUMIN/CREAT 24H UR-RTO: NORMAL MG/G

## 2025-01-08 ENCOUNTER — APPOINTMENT (OUTPATIENT)
Dept: INTERNAL MEDICINE | Facility: CLINIC | Age: 35
End: 2025-01-08

## 2025-01-10 ENCOUNTER — NON-APPOINTMENT (OUTPATIENT)
Age: 35
End: 2025-01-10

## 2025-01-15 ENCOUNTER — NON-APPOINTMENT (OUTPATIENT)
Age: 35
End: 2025-01-15

## 2025-01-15 DIAGNOSIS — F55.3 ABUSE OF STEROIDS OR HORMONES: ICD-10-CM

## 2025-01-15 DIAGNOSIS — E66.01 MORBID (SEVERE) OBESITY DUE TO EXCESS CALORIES: ICD-10-CM

## 2025-01-15 DIAGNOSIS — G47.33 OBSTRUCTIVE SLEEP APNEA (ADULT) (PEDIATRIC): ICD-10-CM

## 2025-01-22 NOTE — HEALTH RISK ASSESSMENT
Currently on Tramadol and Gabapentin  Controlled substance rx'ed and managed by Pain Management provider.  Reports uses Senna for bowels as she experienced diarrhea on MiraLAX in past.   [Excellent] : ~his/her~  mood as  excellent [Yes] : Yes [Monthly or less (1 pt)] : Monthly or less (1 point) [3 or 4 (1 pt)] : 3 or 4  (1 point) [Never (0 pts)] : Never (0 points) [No] : In the past 12 months have you used drugs other than those required for medical reasons? No [No falls in past year] : Patient reported no falls in the past year [0] : 2) Feeling down, depressed, or hopeless: Not at all (0) [PHQ-2 Negative - No further assessment needed] : PHQ-2 Negative - No further assessment needed [Audit-CScore] : 2 [[id number]] : 0 [Change in mental status noted] : No change in mental status noted [Language] : denies difficulty with language [Behavior] : denies difficulty with behavior [Learning/Retaining New Information] : denies difficulty learning/retaining new information [Handling Complex Tasks] : denies difficulty handling complex tasks [Reasoning] : denies difficulty with reasoning [Spatial Ability and Orientation] : denies difficulty with spatial ability and orientation [None] : None [Employed] : employed [Single] : single [Feels Safe at Home] : Feels safe at home [Fully functional (bathing, dressing, toileting, transferring, walking, feeding)] : Fully functional (bathing, dressing, toileting, transferring, walking, feeding) [Reports changes in hearing] : Reports no changes in hearing [Fully functional (using the telephone, shopping, preparing meals, housekeeping, doing laundry, using] : Fully functional and needs no help or supervision to perform IADLs (using the telephone, shopping, preparing meals, housekeeping, doing laundry, using transportation, managing medications and managing finances) [Reports changes in vision] : Reports no changes in vision [Reports normal functional visual acuity (ie: able to read med bottle)] : Reports normal functional visual acuity [Reports changes in dental health] : Reports no changes in dental health [Smoke Detector] : smoke detector [Seat Belt] :  uses seat belt [Never] : Never

## 2025-02-05 ENCOUNTER — APPOINTMENT (OUTPATIENT)
Dept: INTERNAL MEDICINE | Facility: CLINIC | Age: 35
End: 2025-02-05

## 2025-03-16 ENCOUNTER — NON-APPOINTMENT (OUTPATIENT)
Age: 35
End: 2025-03-16

## 2025-05-27 ENCOUNTER — RX RENEWAL (OUTPATIENT)
Age: 35
End: 2025-05-27

## 2025-08-05 ENCOUNTER — NON-APPOINTMENT (OUTPATIENT)
Age: 35
End: 2025-08-05